# Patient Record
Sex: FEMALE | Race: OTHER | NOT HISPANIC OR LATINO | Employment: OTHER | URBAN - METROPOLITAN AREA
[De-identification: names, ages, dates, MRNs, and addresses within clinical notes are randomized per-mention and may not be internally consistent; named-entity substitution may affect disease eponyms.]

---

## 2017-01-16 ENCOUNTER — OFFICE VISIT (OUTPATIENT)
Dept: FAMILY MEDICINE CLINIC | Facility: CLINIC | Age: 57
End: 2017-01-16

## 2017-01-16 VITALS
DIASTOLIC BLOOD PRESSURE: 82 MMHG | TEMPERATURE: 97.8 F | WEIGHT: 229 LBS | HEART RATE: 74 BPM | HEIGHT: 67 IN | BODY MASS INDEX: 35.94 KG/M2 | SYSTOLIC BLOOD PRESSURE: 118 MMHG | RESPIRATION RATE: 18 BRPM

## 2017-01-16 DIAGNOSIS — R00.2 HEART PALPITATIONS: Primary | ICD-10-CM

## 2017-01-16 DIAGNOSIS — F41.1 GAD (GENERALIZED ANXIETY DISORDER): ICD-10-CM

## 2017-01-16 LAB
ALBUMIN SERPL-MCNC: 4.5 G/DL (ref 3.2–4.8)
ALBUMIN/GLOB SERPL: 1.7 G/DL (ref 1.5–2.5)
ALP SERPL-CCNC: 88 U/L (ref 25–100)
ALT SERPL-CCNC: 29 U/L (ref 7–40)
AST SERPL-CCNC: 31 U/L (ref 0–33)
BASOPHILS # BLD AUTO: 0.02 10*3/MM3 (ref 0–0.2)
BASOPHILS NFR BLD AUTO: 0.2 % (ref 0–1)
BILIRUB SERPL-MCNC: 0.4 MG/DL (ref 0.3–1.2)
BUN SERPL-MCNC: 14 MG/DL (ref 9–23)
BUN/CREAT SERPL: 20 (ref 7–25)
CALCIUM SERPL-MCNC: 10.4 MG/DL (ref 8.7–10.4)
CHLORIDE SERPL-SCNC: 107 MMOL/L (ref 99–109)
CO2 SERPL-SCNC: 28 MMOL/L (ref 20–31)
CREAT SERPL-MCNC: 0.7 MG/DL (ref 0.6–1.3)
EOSINOPHIL # BLD AUTO: 0.14 10*3/MM3 (ref 0.1–0.3)
EOSINOPHIL NFR BLD AUTO: 1.6 % (ref 0–3)
ERYTHROCYTE [DISTWIDTH] IN BLOOD BY AUTOMATED COUNT: 15.1 % (ref 11.3–14.5)
GLOBULIN SER CALC-MCNC: 2.7 GM/DL
GLUCOSE SERPL-MCNC: 90 MG/DL (ref 70–100)
HCT VFR BLD AUTO: 46.6 % (ref 34.5–44)
HGB BLD-MCNC: 14.8 G/DL (ref 11.5–15.5)
IMM GRANULOCYTES # BLD: 0.03 10*3/MM3 (ref 0–0.03)
IMM GRANULOCYTES NFR BLD: 0.3 % (ref 0–0.6)
LYMPHOCYTES # BLD AUTO: 2.33 10*3/MM3 (ref 0.6–4.8)
LYMPHOCYTES NFR BLD AUTO: 26.2 % (ref 24–44)
MAGNESIUM SERPL-MCNC: 2.2 MG/DL (ref 1.3–2.7)
MCH RBC QN AUTO: 29.7 PG (ref 27–31)
MCHC RBC AUTO-ENTMCNC: 31.8 G/DL (ref 32–36)
MCV RBC AUTO: 93.6 FL (ref 80–99)
MONOCYTES # BLD AUTO: 0.8 10*3/MM3 (ref 0–1)
MONOCYTES NFR BLD AUTO: 9 % (ref 0–12)
NEUTROPHILS # BLD AUTO: 5.58 10*3/MM3 (ref 1.5–8.3)
NEUTROPHILS NFR BLD AUTO: 62.7 % (ref 41–71)
PLATELET # BLD AUTO: 248 10*3/MM3 (ref 150–450)
POTASSIUM SERPL-SCNC: 5.1 MMOL/L (ref 3.5–5.5)
PROT SERPL-MCNC: 7.2 G/DL (ref 5.7–8.2)
RBC # BLD AUTO: 4.98 10*6/MM3 (ref 3.89–5.14)
SODIUM SERPL-SCNC: 140 MMOL/L (ref 132–146)
TSH SERPL DL<=0.005 MIU/L-ACNC: 1.82 MIU/ML (ref 0.35–5.35)
WBC # BLD AUTO: 8.9 10*3/MM3 (ref 3.5–10.8)

## 2017-01-16 PROCEDURE — 99214 OFFICE O/P EST MOD 30 MIN: CPT | Performed by: FAMILY MEDICINE

## 2017-01-16 PROCEDURE — 93000 ELECTROCARDIOGRAM COMPLETE: CPT | Performed by: FAMILY MEDICINE

## 2017-01-16 NOTE — MR AVS SNAPSHOT
Dede Solitario   2017 10:00 AM   Office Visit    Dept Phone:  [phone]   Encounter #:  [id number]    Provider:  Quinn Gonzales MD   Department:  [facility]                Your Full Care Plan              Your Updated Medication List          This list is accurate as of: 17 10:21 AM.  Always use your most recent med list.                aspirin 81 MG tablet       raNITIdine 150 MG tablet   Commonly known as:  ZANTAC   Take 1 tablet by mouth 2 (two) times a day.               We Performed the Following     CBC & Differential     Comprehensive Metabolic Panel     Magnesium     TSH       You Were Diagnosed With        Codes Comments    Heart palpitations    -  Primary ICD-10-CM: R00.2  ICD-9-CM: 785.1     MICHAEL (generalized anxiety disorder)     ICD-10-CM: F41.1  ICD-9-CM: 300.02       Instructions     None    Patient Instructions History      Upcoming Appointments     Visit Type Date Time Department    OFFICE VISIT 2017 10:00 AM [facility]      [employer] Signup     [facility] [employer] allows you to send messages to your doctor, view your test results, renew your prescriptions, schedule appointments, and more. To sign up, go to j-[email or web address] and click on the Sign Up Now link in the New User? box. Enter your [employer] Activation Code exactly as it appears below along with the last four digits of your Social Security Number and your Date of Birth () to complete the sign-up process. If you do not sign up before the expiration date, you must request a new code.    [employer] Activation Code: [email or web address]  Expires: 2017 10:21 AM    If you have questions, you can email [email or web address] or call [phone] to talk to our [employer] staff. Remember, [employer] is NOT to be used for urgent needs. For medical emergencies, dial 911.               Other Info from Your Visit           Allergies     Biaxin [Clarithromycin]   "    TABS      Reason for Visit     Palpitations           Vital Signs     Blood Pressure Pulse Temperature Respirations Height Weight    118/82 74 97.8 °F (36.6 °C) 18 67\" (170.2 cm) 229 lb (104 kg)    Body Mass Index Smoking Status                35.87 kg/m2 Current Every Day Smoker          Problems and Diagnoses Noted     MICHAEL (generalized anxiety disorder)    Heart palpitations        "

## 2017-01-16 NOTE — PROGRESS NOTES
Subjective   Dede Jerez is a 56 y.o. female.     History of Present Illness   She is going to weight loss clinic, [facility] and getting some type of injections from them  She was having palpitations the last 2 weeks  Just strong beats,  They feel fast and sometimes feel irregular  Last 10-15 seconds  Worse when she is stressed  Better when she calms down  No CP and no SOA with these and not related to exercise  Happened 2-5 times a day  She can walk 2-3 miles on a treadmill without these happening    She notes she has been stressed as well  Her 15 year old grandson has an anuerysm and her  has cancer  Counseling continues tohelp her, she tried medicine in the past    The following portions of the patient's history were reviewed and updated as appropriate: allergies, current medications, past family history, past medical history, past social history, past surgical history and problem list.    Review of Systems   Constitutional: Negative.    HENT: Negative.    Eyes: Negative.    Respiratory: Negative.  Negative for shortness of breath.    Cardiovascular: Positive for palpitations. Negative for chest pain.   Gastrointestinal: Negative.    Musculoskeletal: Negative.    Skin: Negative.    Neurological: Negative.    Psychiatric/Behavioral: The patient is nervous/anxious.    All other systems reviewed and are negative.      Objective   Physical Exam   Constitutional: She is oriented to person, place, and time. She appears well-developed and well-nourished. No distress.   HENT:   Head: Normocephalic and atraumatic.   Right Ear: External ear normal.   Left Ear: External ear normal.   Nose: Nose normal.   Mouth/Throat: Oropharynx is clear and moist.   Eyes: Conjunctivae and EOM are normal.   Neck: Normal range of motion. Neck supple. No thyromegaly present.   Cardiovascular: Normal rate, regular rhythm and normal heart sounds.    No murmur heard.  Pulmonary/Chest: Effort normal and breath sounds normal. No respiratory  distress.   Abdominal: Soft. Bowel sounds are normal. She exhibits no distension and no mass. There is no tenderness.   Lymphadenopathy:     She has no cervical adenopathy.   Neurological: She is alert and oriented to person, place, and time.   Skin: Skin is warm and dry.   Psychiatric: She has a normal mood and affect. Her behavior is normal. Judgment and thought content normal.   Nursing note and vitals reviewed.    EKG: normal EKG, normal sinus rhythm, there are no previous tracings available for comparison.      Assessment/Plan   Dede was seen today for palpitations.    Diagnoses and all orders for this visit:    Heart palpitations  -     Magnesium  -     TSH  -     Comprehensive Metabolic Panel  -     CBC & Differential  -     Cardiac Event Monitor; Future    MICHAEL (generalized anxiety disorder)    EKG normal, will check labs and event monitor, f/u pending results, pt agrees.  She will also call if this is not better  Anxiety stable, continue with counseling

## 2017-01-20 ENCOUNTER — TELEPHONE (OUTPATIENT)
Dept: FAMILY MEDICINE CLINIC | Facility: CLINIC | Age: 57
End: 2017-01-20

## 2017-01-20 RX ORDER — HYDROXYZINE 50 MG/1
TABLET, FILM COATED ORAL
Qty: 30 TABLET | Refills: 1 | Status: SHIPPED | OUTPATIENT
Start: 2017-01-20 | End: 2017-03-15

## 2017-01-20 NOTE — TELEPHONE ENCOUNTER
----- Message from Pamela Shi sent at 1/20/2017 10:10 AM EST -----  Contact: CHRISTIANO PRIEST  THE PATIENTS   FABIANA ESQUEDA RECOMMENDED THAT THE PATIENT BE PLACED ON VISTARIL FOR HER NERVES DIDN'T RECOMMEND A DOSAGE AMOUNT. HER PHARMACY [place] IF THERE ARE QUESTIONS   [phone]

## 2017-01-30 ENCOUNTER — TELEPHONE (OUTPATIENT)
Dept: FAMILY MEDICINE CLINIC | Facility: CLINIC | Age: 57
End: 2017-01-30

## 2017-01-30 DIAGNOSIS — I47.1 SVT (SUPRAVENTRICULAR TACHYCARDIA) (HCC): Primary | ICD-10-CM

## 2017-01-30 NOTE — TELEPHONE ENCOUNTER
----- Message from Ethel Fiore sent at 1/30/2017 10:59 AM EST -----  Contact: Smith  Patient's heart monitor detected a heart abnormality on Saturday. She was told to either go to the ER or check with her doctor on Monday. Patient would like a call back [phone].

## 2017-01-30 NOTE — TELEPHONE ENCOUNTER
Pt had a run of SVT, short lived and it resolved on her own.  On call doctor said she was stable but I want her to see a cardiologist to review what the treatment is going to be for this irregular rhythm.  Referral order in

## 2017-02-06 ENCOUNTER — OFFICE VISIT (OUTPATIENT)
Dept: CARDIOLOGY | Facility: HOSPITAL | Age: 57
End: 2017-02-06

## 2017-02-06 VITALS
WEIGHT: 227 LBS | HEART RATE: 92 BPM | BODY MASS INDEX: 35.63 KG/M2 | TEMPERATURE: 98.3 F | DIASTOLIC BLOOD PRESSURE: 73 MMHG | RESPIRATION RATE: 20 BRPM | SYSTOLIC BLOOD PRESSURE: 123 MMHG | HEIGHT: 67 IN | OXYGEN SATURATION: 98 %

## 2017-02-06 DIAGNOSIS — I47.1 SVT (SUPRAVENTRICULAR TACHYCARDIA) (HCC): Primary | ICD-10-CM

## 2017-02-06 DIAGNOSIS — R00.2 HEART PALPITATIONS: ICD-10-CM

## 2017-02-06 DIAGNOSIS — F41.1 GAD (GENERALIZED ANXIETY DISORDER): ICD-10-CM

## 2017-02-06 PROCEDURE — 99214 OFFICE O/P EST MOD 30 MIN: CPT | Performed by: NURSE PRACTITIONER

## 2017-02-06 NOTE — PATIENT INSTRUCTIONS
You will be called with a date and time for your echo in [place]  Stop ASA  Start Metoprolol 1/2 tablet twice a day  You will be called with a date and time for your appt with Cardiology

## 2017-02-06 NOTE — PROGRESS NOTES
Encounter Date:02/06/2017      Patient ID: Dede Jerez is a 56 y.o. female.        Subjective:     Chief Complaint: Establish Care (SVT)     History of Present Illness Patient presents to the [facility] today at the request of her PCP for ongoing evaluation of her SVT. Patient has been wearing a Biotelemetry monitor that has showed some episodes of SVT with heart rates up to 160. Patient notes that she is symptomatic with the SVT and notes palpitations, tachycardia, chest pain/pressure during episodes. She does deny  dyspnea, presyncope, syncope, orthopnea, PND, abdominal fullness, early satiety, claudication, cough or edema.    Patient Active Problem List   Diagnosis   • Arthritis   • MICHAEL (generalized anxiety disorder)   • Tobacco abuse   • Heart palpitations   • SVT (supraventricular tachycardia)   • Gastroesophageal reflux disease without esophagitis       Past Surgical History   Procedure Laterality Date   • Carpal tunnel release Bilateral    • Tubal abdominal ligation  1980       Allergies   Allergen Reactions   • Biaxin [Clarithromycin] Confusion     TABS         Current Outpatient Prescriptions:   •  aspirin 81 MG tablet, Take 81 mg by mouth daily., Disp: , Rfl:   •  hydrOXYzine (ATARAX) 50 MG tablet, 1/2-1 po q 8 hours PRN, Disp: 30 tablet, Rfl: 1  •  ranitidine (ZANTAC) 150 MG tablet, Take 1 tablet by mouth 2 (two) times a day., Disp: 60 tablet, Rfl: 5      The following portions of the chart were reviewed and updated as appropriate: Allergies, current medications, past family history, social history, past medical history.     Review of Systems   Constitution: Positive for weight loss. Negative for chills, decreased appetite, diaphoresis, fever, weakness, malaise/fatigue, night sweats and weight gain.   HENT: Negative for congestion, headaches and nosebleeds.    Eyes: Negative for blurred vision, visual disturbance and visual halos.   Cardiovascular: Positive for chest pain, irregular  "heartbeat and palpitations. Negative for claudication, cyanosis, dyspnea on exertion, leg swelling, near-syncope, orthopnea, paroxysmal nocturnal dyspnea and syncope.   Respiratory: Positive for snoring. Negative for cough, hemoptysis, shortness of breath, sleep disturbances due to breathing, sputum production and wheezing.    Endocrine: Negative for cold intolerance, heat intolerance, polydipsia, polyphagia and polyuria.   Hematologic/Lymphatic: Does not bruise/bleed easily.   Skin: Negative for dry skin, itching and rash.   Musculoskeletal: Positive for arthritis. Negative for falls, joint pain, joint swelling, muscle weakness and myalgias.   Gastrointestinal: Negative for bloating, abdominal pain, constipation, diarrhea, dysphagia, heartburn, melena, nausea and vomiting.   Genitourinary: Positive for nocturia. Negative for dysuria, flank pain and hematuria.   Neurological: Positive for loss of balance. Negative for difficulty with concentration, excessive daytime sleepiness and dizziness.   Psychiatric/Behavioral: Negative for altered mental status and depression. The patient is nervous/anxious.    Allergic/Immunologic: Positive for environmental allergies.           Objective:     Vitals:    02/06/17 1124 02/06/17 1125 02/06/17 1126   BP: 122/75 126/79 123/73   BP Location: Right arm Left arm Left arm   Patient Position: Sitting Sitting Standing   Pulse: 80  92   Resp: 20     Temp: 98.3 °F (36.8 °C)     TempSrc: Temporal Artery      SpO2: 98%     Weight: 227 lb (103 kg)     Height: 67\" (170.2 cm)           Physical Exam   Constitutional: She is oriented to person, place, and time. She appears well-developed and well-nourished. She is active and cooperative. No distress.   HENT:   Head: Normocephalic and atraumatic.   Mouth/Throat: Oropharynx is clear and moist.   Eyes: Conjunctivae and EOM are normal. Pupils are equal, round, and reactive to light.   Neck: Normal range of motion. Neck supple. No JVD present. No " tracheal deviation present. No thyromegaly present.   Cardiovascular: Normal rate, regular rhythm, normal heart sounds and intact distal pulses.    Pulmonary/Chest: Effort normal and breath sounds normal.   Abdominal: Soft. Bowel sounds are normal. She exhibits no distension. There is no tenderness.   Musculoskeletal: Normal range of motion.   Neurological: She is alert and oriented to person, place, and time.   Skin: Skin is warm, dry and intact.   Psychiatric: She has a normal mood and affect. Her behavior is normal.   Nursing note and vitals reviewed.      Lab and Diagnostic Review:    1/16/ 2017: Glucose 90, BUN 14, creatinine 0.7, sodium 140, potassium 5.1, chloride 107, CO2 28, calcium 10.4, total protein 7.2, albumen 4.5, total bilirubin 0.4, alkaline phosphatase 88, AST 31, ALT 29, TSH 1.818, magnesium 2.2, WBC 8.9, RBC 4.98, hemoglobin 14.8, hematocrit 46.6, platelets 248      Assessment and Plan:         1. SVT (supraventricular tachycardia)  Patient to begin metoprolol 25 mg 1/2 tablet bid   - Adult Transthoracic Echo Complete; Future  Amb referral to Cardiology  2. Heart palpitations  Related to SVT  Patient to begin metoprolol tartrate 25 mg 1/2 tablet bid    3. MICHAEL (generalized anxiety disorder)  Prn atarax    It has been a pleasure to participate in the care of this patient.  Patient was instructed to call the [facility] with any questions, concerns, or worsening symptoms.      * Please note that portions of this note were completed with a voice recognition program. Efforts were made to edit the dictation but occasionally words are transcribed.

## 2017-02-08 ENCOUNTER — HOSPITAL ENCOUNTER (OUTPATIENT)
Dept: CARDIOLOGY | Facility: HOSPITAL | Age: 57
Discharge: HOME | End: 2017-02-08
Admitting: NURSE PRACTITIONER

## 2017-02-08 VITALS
SYSTOLIC BLOOD PRESSURE: 98 MMHG | DIASTOLIC BLOOD PRESSURE: 51 MMHG | WEIGHT: 227 LBS | BODY MASS INDEX: 35.63 KG/M2 | HEIGHT: 67 IN

## 2017-02-08 DIAGNOSIS — I47.1 SVT (SUPRAVENTRICULAR TACHYCARDIA) (HCC): ICD-10-CM

## 2017-02-08 LAB
BH CV ECHO MEAS - AO ROOT AREA (BSA CORRECTED): 1.3
BH CV ECHO MEAS - AO ROOT AREA: 6.2 CM^2
BH CV ECHO MEAS - AO ROOT DIAM: 2.8 CM
BH CV ECHO MEAS - BSA(HAYCOCK): 2.2 M^2
BH CV ECHO MEAS - BSA: 2.1 M^2
BH CV ECHO MEAS - BZI_BMI: 35.6 KILOGRAMS/M^2
BH CV ECHO MEAS - BZI_METRIC_HEIGHT: 170.2 CM
BH CV ECHO MEAS - BZI_METRIC_WEIGHT: 103 KG
BH CV ECHO MEAS - CONTRAST EF (2CH): 73.3 ML/M^2
BH CV ECHO MEAS - CONTRAST EF 4CH: 73.2 ML/M^2
BH CV ECHO MEAS - EDV(CUBED): 96.5 ML
BH CV ECHO MEAS - EDV(MOD-SP2): 60 ML
BH CV ECHO MEAS - EDV(MOD-SP4): 41 ML
BH CV ECHO MEAS - EDV(TEICH): 96.7 ML
BH CV ECHO MEAS - EF(CUBED): 73.9 %
BH CV ECHO MEAS - EF(MOD-SP2): 73.3 %
BH CV ECHO MEAS - EF(MOD-SP4): 73 %
BH CV ECHO MEAS - EF(TEICH): 65.8 %
BH CV ECHO MEAS - ESV(CUBED): 25.2 ML
BH CV ECHO MEAS - ESV(MOD-SP2): 16 ML
BH CV ECHO MEAS - ESV(MOD-SP4): 11 ML
BH CV ECHO MEAS - ESV(TEICH): 33.1 ML
BH CV ECHO MEAS - FS: 36.1 %
BH CV ECHO MEAS - IVS/LVPW: 1.1
BH CV ECHO MEAS - IVSD: 1.1 CM
BH CV ECHO MEAS - LA DIMENSION: 3.6 CM
BH CV ECHO MEAS - LA/AO: 1.3
BH CV ECHO MEAS - LV DIASTOLIC VOL/BSA (35-75): 19.2 ML/M^2
BH CV ECHO MEAS - LV MASS(C)D: 167.1 GRAMS
BH CV ECHO MEAS - LV MASS(C)DI: 78.3 GRAMS/M^2
BH CV ECHO MEAS - LV SYSTOLIC VOL/BSA (12-30): 5.2 ML/M^2
BH CV ECHO MEAS - LVIDD: 4.6 CM
BH CV ECHO MEAS - LVIDS: 2.9 CM
BH CV ECHO MEAS - LVLD AP2: 6.8 CM
BH CV ECHO MEAS - LVLD AP4: 6.4 CM
BH CV ECHO MEAS - LVLS AP2: 5.8 CM
BH CV ECHO MEAS - LVLS AP4: 5.3 CM
BH CV ECHO MEAS - LVPWD: 1 CM
BH CV ECHO MEAS - MV A MAX VEL: 83.4 CM/SEC
BH CV ECHO MEAS - MV DEC SLOPE: 355.8 CM/SEC^2
BH CV ECHO MEAS - MV DEC TIME: 0.2 SEC
BH CV ECHO MEAS - MV E MAX VEL: 83.4 CM/SEC
BH CV ECHO MEAS - MV E/A: 1
BH CV ECHO MEAS - PA ACC SLOPE: 694.4 CM/SEC^2
BH CV ECHO MEAS - PA ACC TIME: 0.13 SEC
BH CV ECHO MEAS - PA PR(ACCEL): 18.8 MMHG
BH CV ECHO MEAS - RAP SYSTOLE: 3 MMHG
BH CV ECHO MEAS - RVDD: 2.6 CM
BH CV ECHO MEAS - RVSP: 20.7 MMHG
BH CV ECHO MEAS - SI(CUBED): 33.4 ML/M^2
BH CV ECHO MEAS - SI(MOD-SP2): 20.6 ML/M^2
BH CV ECHO MEAS - SI(MOD-SP4): 14.1 ML/M^2
BH CV ECHO MEAS - SI(TEICH): 29.8 ML/M^2
BH CV ECHO MEAS - SV(CUBED): 71.3 ML
BH CV ECHO MEAS - SV(MOD-SP2): 44 ML
BH CV ECHO MEAS - SV(MOD-SP4): 30 ML
BH CV ECHO MEAS - SV(TEICH): 63.6 ML
BH CV ECHO MEAS - TAPSE (>1.6): 2 CM2
BH CV ECHO MEAS - TR MAX VEL: 209.5 CM/SEC
BH CV XLRA - RV BASE: 2.9 CM
BH CV XLRA - RV LENGTH: 6.2 CM
BH CV XLRA - RV MID: 3 CM
BH CV XLRA - TDI S': 9.5 CM/SEC
LEFT ATRIUM VOLUME INDEX: 23.5 ML/M2
LV EF 2D ECHO EST: 65 %

## 2017-02-08 PROCEDURE — 93306 TTE W/DOPPLER COMPLETE: CPT

## 2017-02-08 PROCEDURE — 93306 TTE W/DOPPLER COMPLETE: CPT | Performed by: INTERNAL MEDICINE

## 2017-02-09 PROBLEM — K21.9 GASTROESOPHAGEAL REFLUX DISEASE WITHOUT ESOPHAGITIS: Status: ACTIVE | Noted: 2017-02-09

## 2017-02-09 PROBLEM — I47.1 SVT (SUPRAVENTRICULAR TACHYCARDIA) (HCC): Status: ACTIVE | Noted: 2017-02-09

## 2017-02-09 PROBLEM — I47.10 SVT (SUPRAVENTRICULAR TACHYCARDIA): Status: ACTIVE | Noted: 2017-02-09

## 2017-02-20 DIAGNOSIS — I47.1 SVT (SUPRAVENTRICULAR TACHYCARDIA) (HCC): Primary | ICD-10-CM

## 2017-02-21 ENCOUNTER — TELEPHONE (OUTPATIENT)
Dept: CARDIOLOGY | Facility: HOSPITAL | Age: 57
End: 2017-02-21

## 2017-02-21 NOTE — TELEPHONE ENCOUNTER
----- Message from Sapna Wang sent at 2/20/2017 12:06 PM EST -----  Patient reports to still have some episodes of palpitations and tachy episodes but they are less severe and less frequent.

## 2017-03-15 ENCOUNTER — CONSULT (OUTPATIENT)
Dept: CARDIOLOGY | Facility: CLINIC | Age: 57
End: 2017-03-15

## 2017-03-15 VITALS
HEIGHT: 67 IN | DIASTOLIC BLOOD PRESSURE: 82 MMHG | SYSTOLIC BLOOD PRESSURE: 126 MMHG | BODY MASS INDEX: 33.65 KG/M2 | HEART RATE: 77 BPM | WEIGHT: 214.4 LBS

## 2017-03-15 DIAGNOSIS — Z72.0 TOBACCO ABUSE: ICD-10-CM

## 2017-03-15 DIAGNOSIS — I47.1 SVT (SUPRAVENTRICULAR TACHYCARDIA) (HCC): Primary | ICD-10-CM

## 2017-03-15 PROCEDURE — 93000 ELECTROCARDIOGRAM COMPLETE: CPT | Performed by: INTERNAL MEDICINE

## 2017-03-15 PROCEDURE — 99244 OFF/OP CNSLTJ NEW/EST MOD 40: CPT | Performed by: INTERNAL MEDICINE

## 2017-03-15 NOTE — PROGRESS NOTES
Cardiology Consult     Dede Jerez  1960  [phone]      03/15/17      [facility] CARDIOLOGY    Quinn Gonzales MD  [address] [place] [address] / [facility] [address]    Chief Complaint   Patient presents with   • Palpitations     Problem List:  1. SVT   A. Echocardiogram 2/8/17: EF 65%, mild TR   B. Event Monitor 1/27/17-2/20/17: runs of SVT at 150 bpm  2. Anxiety  3. Tobacco Abuse  4. GERD  5. Arthritis      History of Present Illness:   56 year old WF referred by the [facility] for SVT. She states that in December, she started having really rapid heart beats and rates with occasional skipped beats. It would last all day. And after it stopped, she felt pain/discomfort across her chest and back. It occurred on and off throughout the day and occurred at random.  She was under a lot of stress at the time. She wore a monitor which showed SVT. She did have her symptoms during the monitor . Since then, she has been put on Metoprolol which improved her symptoms. Also, the health problems in her family resolved as well. Now she is doing well with no symptoms and no CP or SOB. She has not gone to the ER for this or received adenosine. She had normal echocardiogram. She has never had a LHC. She had a normal stress test about 7 years ago. In fact, prior to her episodes of palpitations, she was actually walking on the treadmill and doing well. In the past, she had what she thought was panic attacks with some racing hearts.  She drinks 3 cups of coffee daily. No ETOH. She does smoke 1 PPD. She did take Sudafed before all this happened.     Allergies   Allergen Reactions   • Biaxin [Clarithromycin] Confusion     TABS        Cannot display prior to admission medications because the patient has not been admitted in this contact.            Current Outpatient Prescriptions:   •  metoprolol tartrate (LOPRESSOR) 25 MG tablet, Take 1/2 tablet twice a day (Patient taking differently: 25  mg Daily.), Disp: 60 tablet, Rfl: 11  •  ranitidine (ZANTAC) 150 MG tablet, Take 1 tablet by mouth 2 (two) times a day. (Patient taking differently: Take 150 mg by mouth 2 (Two) Times a Day As Needed.), Disp: 60 tablet, Rfl: 5    Social History     Social History   • Marital status:      Spouse name: N/A   • Number of children: 2   • Years of education: N/A     Social History Main Topics   • Smoking status: Current Every Day Smoker     Packs/day: 0.75     Types: Cigarettes   • Smokeless tobacco: Never Used      Comment: on and off since teenager   • Alcohol use No   • Drug use: Defer   • Sexual activity: Defer     Other Topics Concern   • None     Social History Narrative    Patient consumes 2-3 cups coffee daily.     Patient lives at home with .            Family History   Problem Relation Age of Onset   • Heart attack Mother    • Coronary artery disease Mother    • Diabetes Mother    • Hypertension Mother    • Hypertension Father    • Hypertension Sister    • Diabetes Sister    • Hypertension Brother    • Diabetes Brother    • Hypertension Maternal Grandmother    • Hypertension Maternal Grandfather    • No Known Problems Paternal Grandmother    • No Known Problems Paternal Grandfather        REVIEW OF SYSTEMS:   CONST:  No weight loss, fever, chills, weakness or fatigue.   HEENT:  No visual loss, blurred vision, double vision, yellow sclerae.                   No hearing loss, congestion, sore throat.   SKIN:      No rashes, urticaria, ulcers, sores.     RESP:     No shortness of breath, hemoptysis, cough, sputum.   GI:           No anorexia, nausea, vomiting, diarrhea. No abdominal pain, melena.   :         No burning on urination, hematuria or increased frequency.  ENDO:    No diaphoresis, cold or heat intolerance. No polyuria or polydipsia.   NEURO:  No headache, dizziness, syncope, paralysis, ataxia, or parasthesias.                  No change in bowel or bladder control. No history of  "CVA/TIA  MUSC:    No muscle, back pain, joint pain or stiffness.   HEME:    No anemia, bleeding, bruising. No history of DVT/PE.  PSYCH:  No history of depression, Positive history of anxiety    Vitals:    03/15/17 1601   BP: 126/82   BP Location: Left arm   Patient Position: Sitting   Pulse: 77   Weight: 214 lb 6.4 oz (97.3 kg)   Height: 67\" (170.2 cm)       Physical Exam:  GEN: Well nourished, Well- developed  No acute distress  HEENT: Normocephalic, Atraumatic, PERRLA, moist mucous membranes  NECK: supple, NO JVD, no thyromegaly, no lymphadenopathy  CARD: S1S2  RRR no murmur, gallop, rub  LUNGS: Clear to auscultataion, normal respiratory effort  ABDOMEN: Soft, nontender, normal bowel sounds  EXTREMITIES:No gross deformities,  No clubbing, cyanosis, or edema  SKIN: Warm, dry  NEURO: No focal deficits  PSYCHIATRIC: Normal affect and mood      Data:                                        ECG 12 Lead  Date/Time: 3/15/2017 5:02 PM  Performed by: DERRICK GRIMES  Authorized by: DERRICK GRIMES   Rhythm: sinus rhythm  BPM: 77                Assessment and Plan:   1. SVT (supraventricular tachycardia)    2. Tobacco abuse      1. 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 Options include continuing metoprolol vs EPS +/- RFA SVT. The risks, benefits, and alternatives of the procedure have been reviewed and the patient wants to try the medication for now and monitor.     2. Tobacco abuse- counseled cessation     Scribed for Derrick Grimes MD by Monica Mcpherson PA-C. 3/15/2017  5:04 PM     IDerrick MD, personally performed the services face to face as described in this documentation and as scribed by the above named individual in my presence, and it is both accurate and complete.  3/15/2017  5:06 PM                    "

## 2017-07-18 ENCOUNTER — TELEPHONE (OUTPATIENT)
Dept: CARDIOLOGY | Facility: CLINIC | Age: 57
End: 2017-07-18

## 2017-07-18 DIAGNOSIS — I47.1 SVT (SUPRAVENTRICULAR TACHYCARDIA) (HCC): Primary | ICD-10-CM

## 2017-09-13 ENCOUNTER — PREP FOR SURGERY (OUTPATIENT)
Dept: OTHER | Facility: HOSPITAL | Age: 57
End: 2017-09-13

## 2017-09-13 DIAGNOSIS — I47.1 SVT (SUPRAVENTRICULAR TACHYCARDIA) (HCC): Primary | ICD-10-CM

## 2017-09-13 RX ORDER — ACETAMINOPHEN 325 MG/1
650 TABLET ORAL EVERY 4 HOURS PRN
Status: CANCELLED | OUTPATIENT
Start: 2017-09-13

## 2017-09-13 RX ORDER — NITROGLYCERIN 0.4 MG/1
0.4 TABLET SUBLINGUAL
Status: CANCELLED | OUTPATIENT
Start: 2017-09-13

## 2017-09-13 RX ORDER — PROMETHAZINE HYDROCHLORIDE 25 MG/ML
12.5 INJECTION, SOLUTION INTRAMUSCULAR; INTRAVENOUS EVERY 4 HOURS PRN
Status: CANCELLED | OUTPATIENT
Start: 2017-09-13

## 2017-09-14 RX ORDER — RANITIDINE 150 MG/1
TABLET ORAL
Qty: 60 TABLET | Refills: 5 | Status: SHIPPED | OUTPATIENT
Start: 2017-09-14 | End: 2018-03-26

## 2017-09-25 ENCOUNTER — OFFICE VISIT (OUTPATIENT)
Dept: FAMILY MEDICINE CLINIC | Facility: CLINIC | Age: 57
End: 2017-09-25

## 2017-09-25 VITALS
SYSTOLIC BLOOD PRESSURE: 112 MMHG | HEART RATE: 76 BPM | HEIGHT: 67 IN | BODY MASS INDEX: 28.56 KG/M2 | TEMPERATURE: 97.3 F | WEIGHT: 182 LBS | RESPIRATION RATE: 18 BRPM | DIASTOLIC BLOOD PRESSURE: 78 MMHG

## 2017-09-25 DIAGNOSIS — R00.2 HEART PALPITATIONS: Primary | ICD-10-CM

## 2017-09-25 DIAGNOSIS — K21.9 GASTROESOPHAGEAL REFLUX DISEASE WITHOUT ESOPHAGITIS: ICD-10-CM

## 2017-09-25 LAB
ALBUMIN SERPL-MCNC: 4.6 G/DL (ref 3.2–4.8)
ALBUMIN/GLOB SERPL: 1.8 G/DL (ref 1.5–2.5)
ALP SERPL-CCNC: 79 U/L (ref 25–100)
ALT SERPL-CCNC: 19 U/L (ref 7–40)
AST SERPL-CCNC: 18 U/L (ref 0–33)
BASOPHILS # BLD AUTO: 0.04 10*3/MM3 (ref 0–0.2)
BASOPHILS NFR BLD AUTO: 0.5 % (ref 0–1)
BILIRUB SERPL-MCNC: 0.4 MG/DL (ref 0.3–1.2)
BUN SERPL-MCNC: 15 MG/DL (ref 9–23)
BUN/CREAT SERPL: 18.8 (ref 7–25)
CALCIUM SERPL-MCNC: 9.6 MG/DL (ref 8.7–10.4)
CHLORIDE SERPL-SCNC: 109 MMOL/L (ref 99–109)
CO2 SERPL-SCNC: 29 MMOL/L (ref 20–31)
CREAT SERPL-MCNC: 0.8 MG/DL (ref 0.6–1.3)
EOSINOPHIL # BLD AUTO: 0.13 10*3/MM3 (ref 0–0.3)
EOSINOPHIL NFR BLD AUTO: 1.8 % (ref 0–3)
ERYTHROCYTE [DISTWIDTH] IN BLOOD BY AUTOMATED COUNT: 13.5 % (ref 11.3–14.5)
GLOBULIN SER CALC-MCNC: 2.5 GM/DL
GLUCOSE SERPL-MCNC: 86 MG/DL (ref 70–100)
HCT VFR BLD AUTO: 44.6 % (ref 34.5–44)
HGB BLD-MCNC: 14.4 G/DL (ref 11.5–15.5)
IMM GRANULOCYTES # BLD: 0.02 10*3/MM3 (ref 0–0.03)
IMM GRANULOCYTES NFR BLD: 0.3 % (ref 0–0.6)
LYMPHOCYTES # BLD AUTO: 2.32 10*3/MM3 (ref 0.6–4.8)
LYMPHOCYTES NFR BLD AUTO: 31.4 % (ref 24–44)
MAGNESIUM SERPL-MCNC: 2 MG/DL (ref 1.3–2.7)
MCH RBC QN AUTO: 30.6 PG (ref 27–31)
MCHC RBC AUTO-ENTMCNC: 32.3 G/DL (ref 32–36)
MCV RBC AUTO: 94.7 FL (ref 80–99)
MONOCYTES # BLD AUTO: 0.65 10*3/MM3 (ref 0–1)
MONOCYTES NFR BLD AUTO: 8.8 % (ref 0–12)
NEUTROPHILS # BLD AUTO: 4.24 10*3/MM3 (ref 1.5–8.3)
NEUTROPHILS NFR BLD AUTO: 57.2 % (ref 41–71)
PLATELET # BLD AUTO: 257 10*3/MM3 (ref 150–450)
POTASSIUM SERPL-SCNC: 5.5 MMOL/L (ref 3.5–5.5)
PROT SERPL-MCNC: 7.1 G/DL (ref 5.7–8.2)
RBC # BLD AUTO: 4.71 10*6/MM3 (ref 3.89–5.14)
SODIUM SERPL-SCNC: 144 MMOL/L (ref 132–146)
TSH SERPL DL<=0.005 MIU/L-ACNC: 1.16 MIU/ML (ref 0.35–5.35)
WBC # BLD AUTO: 7.4 10*3/MM3 (ref 3.5–10.8)

## 2017-09-25 PROCEDURE — 99213 OFFICE O/P EST LOW 20 MIN: CPT | Performed by: FAMILY MEDICINE

## 2017-09-25 NOTE — PROGRESS NOTES
Subjective   Dede Jerez is a 57 y.o. female.     History of Present Illness     Overall pt has been doing great.  No recent heart palpitations  No complaints and tolerating the medicine well    She still uses the zantac but most of the time once a day    The following portions of the patient's history were reviewed and updated as appropriate: allergies, current medications, past family history, past medical history, past social history, past surgical history and problem list.    Review of Systems   Constitutional: Negative.    Respiratory: Negative.    Cardiovascular: Negative.    Gastrointestinal: Negative.    Psychiatric/Behavioral: Negative.        Objective   Physical Exam   Constitutional: She appears well-developed and well-nourished. No distress.   Cardiovascular: Normal rate, regular rhythm and normal heart sounds.    Pulmonary/Chest: Effort normal and breath sounds normal.   Psychiatric: She has a normal mood and affect. Her behavior is normal.   Nursing note and vitals reviewed.      Assessment/Plan   Dede was seen today for follow-up.    Diagnoses and all orders for this visit:    Heart palpitations  -     metoprolol tartrate (LOPRESSOR) 25 MG tablet; Take 1/2 tablet twice a day  -     CBC & Differential  -     Comprehensive Metabolic Panel  -     Magnesium  -     TSH    Gastroesophageal reflux disease without esophagitis    will continue lopressor and check labs for other causes.  Pt to call INB  Ok PRn zantac.

## 2017-10-30 ENCOUNTER — APPOINTMENT (OUTPATIENT)
Dept: PREADMISSION TESTING | Facility: HOSPITAL | Age: 57
End: 2017-10-30

## 2017-10-30 DIAGNOSIS — I47.1 SVT (SUPRAVENTRICULAR TACHYCARDIA) (HCC): ICD-10-CM

## 2017-10-30 LAB
ANION GAP SERPL CALCULATED.3IONS-SCNC: 3 MMOL/L (ref 3–11)
BUN BLD-MCNC: 16 MG/DL (ref 9–23)
BUN/CREAT SERPL: 22.9 (ref 7–25)
CALCIUM SPEC-SCNC: 9.2 MG/DL (ref 8.7–10.4)
CHLORIDE SERPL-SCNC: 108 MMOL/L (ref 99–109)
CO2 SERPL-SCNC: 30 MMOL/L (ref 20–31)
CREAT BLD-MCNC: 0.7 MG/DL (ref 0.6–1.3)
DEPRECATED RDW RBC AUTO: 45.8 FL (ref 37–54)
ERYTHROCYTE [DISTWIDTH] IN BLOOD BY AUTOMATED COUNT: 13.2 % (ref 11.3–14.5)
GFR SERPL CREATININE-BSD FRML MDRD: 104 ML/MIN/1.73
GFR SERPL CREATININE-BSD FRML MDRD: 86 ML/MIN/1.73
GLUCOSE BLD-MCNC: 84 MG/DL (ref 70–100)
HBA1C MFR BLD: 5.4 % (ref 4.8–5.6)
HCT VFR BLD AUTO: 44.1 % (ref 34.5–44)
HGB BLD-MCNC: 14.4 G/DL (ref 11.5–15.5)
INR PPP: 0.98
MCH RBC QN AUTO: 30.6 PG (ref 27–31)
MCHC RBC AUTO-ENTMCNC: 32.7 G/DL (ref 32–36)
MCV RBC AUTO: 93.8 FL (ref 80–99)
PLATELET # BLD AUTO: 235 10*3/MM3 (ref 150–450)
PMV BLD AUTO: 11.7 FL (ref 6–12)
POTASSIUM BLD-SCNC: 4.8 MMOL/L (ref 3.5–5.5)
PROTHROMBIN TIME: 10.7 SECONDS (ref 9.6–11.5)
RBC # BLD AUTO: 4.7 10*6/MM3 (ref 3.89–5.14)
SODIUM BLD-SCNC: 141 MMOL/L (ref 132–146)
WBC NRBC COR # BLD: 7.91 10*3/MM3 (ref 3.5–10.8)

## 2017-10-30 PROCEDURE — 80048 BASIC METABOLIC PNL TOTAL CA: CPT | Performed by: PHYSICIAN ASSISTANT

## 2017-10-30 PROCEDURE — 85610 PROTHROMBIN TIME: CPT | Performed by: PHYSICIAN ASSISTANT

## 2017-10-30 PROCEDURE — 85027 COMPLETE CBC AUTOMATED: CPT | Performed by: PHYSICIAN ASSISTANT

## 2017-10-30 PROCEDURE — 36415 COLL VENOUS BLD VENIPUNCTURE: CPT

## 2017-10-30 PROCEDURE — 83036 HEMOGLOBIN GLYCOSYLATED A1C: CPT | Performed by: PHYSICIAN ASSISTANT

## 2017-11-09 ENCOUNTER — HOSPITAL ENCOUNTER (OUTPATIENT)
Facility: HOSPITAL | Age: 57
Setting detail: OBSERVATION
Discharge: HOME | End: 2017-11-10
Attending: INTERNAL MEDICINE | Admitting: INTERNAL MEDICINE

## 2017-11-09 DIAGNOSIS — I47.1 SVT (SUPRAVENTRICULAR TACHYCARDIA) (HCC): ICD-10-CM

## 2017-11-09 PROCEDURE — 93623 PRGRMD STIMJ&PACG IV RX NFS: CPT | Performed by: INTERNAL MEDICINE

## 2017-11-09 PROCEDURE — 93613 INTRACARDIAC EPHYS 3D MAPG: CPT | Performed by: INTERNAL MEDICINE

## 2017-11-09 PROCEDURE — 93653 COMPRE EP EVAL TX SVT: CPT | Performed by: INTERNAL MEDICINE

## 2017-11-09 PROCEDURE — 25010000002 FENTANYL CITRATE (PF) 100 MCG/2ML SOLUTION: Performed by: INTERNAL MEDICINE

## 2017-11-09 PROCEDURE — G0378 HOSPITAL OBSERVATION PER HR: HCPCS

## 2017-11-09 PROCEDURE — 25010000002 ONDANSETRON PER 1 MG: Performed by: INTERNAL MEDICINE

## 2017-11-09 PROCEDURE — C1894 INTRO/SHEATH, NON-LASER: HCPCS | Performed by: INTERNAL MEDICINE

## 2017-11-09 PROCEDURE — C1730 CATH, EP, 19 OR FEW ELECT: HCPCS | Performed by: INTERNAL MEDICINE

## 2017-11-09 PROCEDURE — C1732 CATH, EP, DIAG/ABL, 3D/VECT: HCPCS | Performed by: INTERNAL MEDICINE

## 2017-11-09 PROCEDURE — 25010000002 MIDAZOLAM PER 1 MG: Performed by: INTERNAL MEDICINE

## 2017-11-09 RX ORDER — ONDANSETRON 2 MG/ML
INJECTION INTRAMUSCULAR; INTRAVENOUS AS NEEDED
Status: DISCONTINUED | OUTPATIENT
Start: 2017-11-09 | End: 2017-11-09 | Stop reason: HOSPADM

## 2017-11-09 RX ORDER — FENTANYL CITRATE 50 UG/ML
INJECTION, SOLUTION INTRAMUSCULAR; INTRAVENOUS AS NEEDED
Status: DISCONTINUED | OUTPATIENT
Start: 2017-11-09 | End: 2017-11-09 | Stop reason: HOSPADM

## 2017-11-09 RX ORDER — HYDROCODONE BITARTRATE AND ACETAMINOPHEN 5; 325 MG/1; MG/1
1 TABLET ORAL EVERY 4 HOURS PRN
Status: DISCONTINUED | OUTPATIENT
Start: 2017-11-09 | End: 2017-11-10 | Stop reason: HOSPADM

## 2017-11-09 RX ORDER — LIDOCAINE HYDROCHLORIDE 10 MG/ML
INJECTION, SOLUTION INFILTRATION; PERINEURAL AS NEEDED
Status: DISCONTINUED | OUTPATIENT
Start: 2017-11-09 | End: 2017-11-09 | Stop reason: HOSPADM

## 2017-11-09 RX ORDER — ONDANSETRON 2 MG/ML
4 INJECTION INTRAMUSCULAR; INTRAVENOUS EVERY 6 HOURS PRN
Status: DISCONTINUED | OUTPATIENT
Start: 2017-11-09 | End: 2017-11-10 | Stop reason: HOSPADM

## 2017-11-09 RX ORDER — NITROGLYCERIN 0.4 MG/1
0.4 TABLET SUBLINGUAL
Status: DISCONTINUED | OUTPATIENT
Start: 2017-11-09 | End: 2017-11-09 | Stop reason: HOSPADM

## 2017-11-09 RX ORDER — FAMOTIDINE 20 MG/1
20 TABLET, FILM COATED ORAL 2 TIMES DAILY
Status: DISCONTINUED | OUTPATIENT
Start: 2017-11-09 | End: 2017-11-10 | Stop reason: HOSPADM

## 2017-11-09 RX ORDER — PROMETHAZINE HYDROCHLORIDE 25 MG/ML
12.5 INJECTION, SOLUTION INTRAMUSCULAR; INTRAVENOUS EVERY 4 HOURS PRN
Status: DISCONTINUED | OUTPATIENT
Start: 2017-11-09 | End: 2017-11-09 | Stop reason: HOSPADM

## 2017-11-09 RX ORDER — MIDAZOLAM HYDROCHLORIDE 1 MG/ML
INJECTION INTRAMUSCULAR; INTRAVENOUS AS NEEDED
Status: DISCONTINUED | OUTPATIENT
Start: 2017-11-09 | End: 2017-11-09 | Stop reason: HOSPADM

## 2017-11-09 RX ORDER — ACETAMINOPHEN 325 MG/1
650 TABLET ORAL EVERY 4 HOURS PRN
Status: DISCONTINUED | OUTPATIENT
Start: 2017-11-09 | End: 2017-11-09 | Stop reason: HOSPADM

## 2017-11-09 RX ORDER — SODIUM CHLORIDE 9 MG/ML
INJECTION, SOLUTION INTRAVENOUS CONTINUOUS PRN
Status: DISCONTINUED | OUTPATIENT
Start: 2017-11-09 | End: 2017-11-09 | Stop reason: HOSPADM

## 2017-11-09 NOTE — PLAN OF CARE
Problem: Patient Care Overview (Adult)  Goal: Plan of Care Review  Outcome: Ongoing (interventions implemented as appropriate)    11/09/17 1200   Coping/Psychosocial Response Interventions   Plan Of Care Reviewed With patient   Patient Care Overview   Progress no change   Outcome Evaluation   Outcome Summary/Follow up Plan PT TO HAVE EP STUDY WITH DR. LEHMAN TODAY.

## 2017-11-09 NOTE — H&P
Electrophysiology History & Physical    Dede Jerez  2501/1  [id number]  1960    DATE OF ADMISSION: 11/9/2017    Quinn Gonzales MD      Chief complaint: Supraventricular tachycardia      Problem List:  1. SVT                        A. Echocardiogram 2/8/17: EF 65%, mild TR                        B. Event Monitor 1/27/17-2/20/17: runs of SVT at 150 bpm  2. Anxiety  3. Tobacco Abuse  4. GERD  5. Arthritis          Past Medical History:   Diagnosis Date   • Acid reflux    • Anxiety     panic attack related throat constricting making swallowing hard at times    • Arrhythmia    • Cataract     bilat - mild    • Constipation     in past with dieting but fine now    • High cholesterol    • Measles    • Menopause    • Stroke    • TIA (transient ischemic attack) 2010   • Wears glasses    • Wears partial dentures     bottom         Past Surgical History:   Procedure Laterality Date   • CARPAL TUNNEL RELEASE Bilateral    • COLONOSCOPY     • TUBAL ABDOMINAL LIGATION  1980         History of Present Illness:     56 year old WF who presents today for an EP study +/-RFA of her SVT - mostly likely AVNRT, with Dr. Grimes.  In December, she started having really rapid heart beats and rates with occasional skipped beats. It would last all day. And after it stopped, she felt pain/discomfort across her chest and back. It occurred on and off throughout the day and occurred at random.  She was under a lot of stress at the time. She wore a monitor which showed SVT. She did have her symptoms during the monitor . Since then, she has been put on Metoprolol which improved her symptoms. Also, the health problems in her family resolved as well.   She drinks 3 cups of coffee daily.  No ETOH.  She smokes 1 PPD.      She has been off of her metoprolol for the last 5 days in anticipation of her ablation.  She has had multiple episodes of SVT while off of her metoprolol, some lasting a few seconds, some episodes lasting a few hours.  She  has not attempted any vagal maneuvers to break the rhythm, she just sits down and breaths.  She also reports chest pain which she has been experiencing for months and which has not changed in nature or severity.  States it is a dull pain, midsternal radiating to her back.  Does not occur with exertion.  Can walk 2 1/2 miles without any symptoms.  Usually will occur at rest and will last an hour then resolve on its own.  Denies associated lightheadedness or SOB.  She had a normal stress test 7 years ago.        Prescriptions Prior to Admission   Medication Sig Dispense Refill Last Dose   • raNITIdine (ZANTAC) 150 MG tablet TAKE ONE TABLET BY MOUTH TWICE DAILY (Patient taking differently: TAKE ONE TABLET BY MOUTH prn) 60 tablet 5 Past Week at Unknown time   • metoprolol tartrate (LOPRESSOR) 25 MG tablet Take 1/2 tablet twice a day (Patient taking differently: Take 12.5 mg by mouth 2 (Two) Times a Day. Take 1/2 tablet twice a day) 60 tablet 5 11/1/2017       Current Meds  No current facility-administered medications on file prior to encounter.      No current outpatient prescriptions on file prior to encounter.         Social History     Social History   • Marital status:      Spouse name: N/A   • Number of children: 2   • Years of education: N/A     Occupational History   • Not on file.     Social History Main Topics   • Smoking status: Current Every Day Smoker     Packs/day: 1.00     Years: 25.00     Types: Cigarettes   • Smokeless tobacco: Never Used      Comment: on and off since teenager   • Alcohol use No   • Drug use: No   • Sexual activity: Defer     Other Topics Concern   • Not on file     Social History Narrative    Patient consumes 2-3 cups coffee daily.     Patient lives at home with .          Family History:     Heart attack Mother     • Coronary artery disease Mother     • Diabetes Mother     • Hypertension Mother     • Hypertension Father     • Hypertension Sister     • Diabetes Sister    "  • Hypertension Brother     • Diabetes Brother     • Hypertension Maternal Grandmother     • Hypertension Maternal Grandfather     • No Known Problems Paternal Grandmother     • No Known Problems Paternal Grandfather        REVIEW OF SYSTEMS:   CONST:  No weight loss, fever, chills, weakness or fatigue.   HEENT:  No visual loss, blurred vision, double vision, yellow sclerae.                   No hearing loss, congestion, sore throat.   SKIN:      No rashes, urticaria, ulcers, sores.     RESP:     No shortness of breath, hemoptysis, cough, sputum.   GI:           No anorexia, nausea, vomiting, diarrhea. No abdominal pain, melena.   :         No burning on urination, hematuria or increased frequency.  ENDO:    No diaphoresis, cold or heat intolerance. No polyuria or polydipsia.   NEURO:  No headache, dizziness, syncope, paralysis, ataxia, or parasthesias.                  No change in bowel or bladder control. No history of CVA/TIA  MUSC:    No muscle, back pain, joint pain or stiffness.   HEME:    No anemia, bleeding, bruising. No history of DVT/PE.  PSYCH:  No history of depression, anxiety      Objective:     Vitals:    11/09/17 1200   BP: 102/77   BP Location: Left arm   Patient Position: Lying   Pulse: 66   Resp: 18   Temp: 98.2 °F (36.8 °C)   TempSrc: Temporal Artery    SpO2: 100%   Weight: 192 lb 7.4 oz (87.3 kg)   Height: 66\" (167.6 cm)     Body mass index is 31.06 kg/(m^2).  Flowsheet Rows         First Filed Value    Admission Height  66\" (167.6 cm) Documented at 11/09/2017 1200    Admission Weight  192 lb 7.4 oz (87.3 kg) Documented at 11/09/2017 1200          General Appearance:    Alert, cooperative, in no acute distress   Head:    Normocephalic, without obvious abnormality, atraumatic   Eyes:            Lids and lashes normal, conjunctivae and sclerae normal, no   icterus, no pallor, corneas clear, PERRLA       Throat:   No oral lesions, no thrush, oral mucosa moist   Neck:   No adenopathy, supple, " trachea midline, no thyromegaly, no   carotid bruit, no JVD       Lungs:     Clear to auscultation,respirations regular, even and unlabored    Heart:    Regular rhythm and normal rate, normal S1 and S2, no murmur, no gallop, no rub, no click   Chest Wall:    No abnormalities observed   Abdomen:     Normal bowel sounds, no masses, no organomegaly, soft        non-tender, non-distended, no guarding, no rebound  tenderness   Extremities:   Moves all extremities well, no edema, no cyanosis, no redness   Pulses:   Pulses palpable and equal bilaterally. Normal radial, carotid, femoral, dorsalis pedis and posterior tibial pulses bilaterally. Normal abdominal aorta   Skin:   No bleeding, bruising or rash       Psychiatric:   Alert and oented x 3, normal mood and affect.     Vital Sign Min/Max for last 24 hours  Temp  Min: 98.2 °F (36.8 °C)  Max: 98.2 °F (36.8 °C)   BP  Min: 102/77  Max: 102/77   Pulse  Min: 66  Max: 66   Resp  Min: 18  Max: 18   SpO2  Min: 100 %  Max: 100 %   No Data Recorded    No intake or output data in the 24 hours ending 11/09/17 1211        Lab Review:     Lab Results   Component Value Date    GLUCOSE 84 10/30/2017    CALCIUM 9.2 10/30/2017     10/30/2017    K 4.8 10/30/2017    CO2 30.0 10/30/2017     10/30/2017    BUN 16 10/30/2017    CREATININE 0.70 10/30/2017    EGFRIFAFRI 104 10/30/2017    EGFRIFNONA 86 10/30/2017    BCR 22.9 10/30/2017    ANIONGAP 3.0 10/30/2017   ;  Lab Results   Component Value Date    WBC 7.91 10/30/2017    HGB 14.4 10/30/2017    HCT 44.1 (H) 10/30/2017    MCV 93.8 10/30/2017     10/30/2017     Lab Results   Component Value Date    INR 0.98 10/30/2017    PROTIME 10.7 10/30/2017        Tele: NSR    Assessment/Plan:    1.) Supraventricular tachycardia:     -Patient with noted SVT per monitor, most likely AVNRT.  Patient will undergo EPS +/- RFA SVT.  The risks, benefits, and alternatives of the procedure have been reviewed and the patient wishes to proceed.      2.) Tobacco abuse:    -Patient counseled on cessation measures.    3.) Atypical chest pain:    -Does not appear to be anginal in nature, patient report of a normal stress test a few years ago, will monitor for now    SILVIO Javed  [facility]  11/09/17  12:11 PM      I, Manny Grimes MD, personally performed the services face to face as described and documented by the above named individual. I have made any necessary edits and it is both accurate and complete 11/9/2017  4:57 PM

## 2017-11-10 VITALS
WEIGHT: 192.46 LBS | OXYGEN SATURATION: 98 % | TEMPERATURE: 97.7 F | HEART RATE: 70 BPM | HEIGHT: 66 IN | DIASTOLIC BLOOD PRESSURE: 67 MMHG | SYSTOLIC BLOOD PRESSURE: 102 MMHG | RESPIRATION RATE: 15 BRPM | BODY MASS INDEX: 30.93 KG/M2

## 2017-11-10 LAB — MACROSCOPIC EXAM: NORMAL

## 2017-11-10 PROCEDURE — 99225 PR SBSQ OBSERVATION CARE/DAY 25 MINUTES: CPT | Performed by: INTERNAL MEDICINE

## 2017-11-10 PROCEDURE — 87169 MACROSCOPIC EXAM PARASITE: CPT | Performed by: INTERNAL MEDICINE

## 2017-11-10 PROCEDURE — G0378 HOSPITAL OBSERVATION PER HR: HCPCS

## 2017-11-10 PROCEDURE — 93005 ELECTROCARDIOGRAM TRACING: CPT | Performed by: INTERNAL MEDICINE

## 2017-11-10 PROCEDURE — 93010 ELECTROCARDIOGRAM REPORT: CPT | Performed by: INTERNAL MEDICINE

## 2017-11-10 NOTE — PROGRESS NOTES
"[facility]  Progress Note     LOS: 1 day   Patient Care Team:  Quinn Gonzales MD as PCP - General    Chief Complaint:  SVT    Subjective     Feels well. No SOB. Feels much better, can already tell a difference in how she feels. Doing well.    Review of Systems:   Pertinent positives in HPI, all others reviewed and negative.      Objective       Current Facility-Administered Medications:   •  famotidine (PEPCID) tablet 20 mg, 20 mg, Oral, BID, SILVIO Javed  •  HYDROcodone-acetaminophen (NORCO) 5-325 MG per tablet 1 tablet, 1 tablet, Oral, Q4H PRN, Manny Grimes MD  •  ondansetron (ZOFRAN) injection 4 mg, 4 mg, Intravenous, Q6H PRN, Manny Grimes MD    Vital Sign Min/Max for last 24 hours  Temp  Min: 97.7 °F (36.5 °C)  Max: 98.2 °F (36.8 °C)   BP  Min: 95/61  Max: 142/78   Pulse  Min: 63  Max: 94   Resp  Min: 11  Max: 18   SpO2  Min: 95 %  Max: 100 %   Flow (L/min)  Min: 2  Max: 2   Weight  Min: 192 lb 7.4 oz (87.3 kg)  Max: 192 lb 7.4 oz (87.3 kg)     Flowsheet Rows         First Filed Value    Admission Height  66\" (167.6 cm) Documented at 11/09/2017 1200    Admission Weight  192 lb 7.4 oz (87.3 kg) Documented at 11/09/2017 1200          Physical Exam:     General Appearance:    Alert, cooperative, in no acute distress   Lungs:     CTA bilaterally    Heart:    RRR NL S1 S2   Chest Wall:    No abnormalities observed   Abdomen:     Normal bowel sounds, no masses,  soft  non-tender, non-distended, no guarding, no rebound tenderness   Extremities:   Moves all extremities well, no edema, no cyanosis, no             redness   Pulses:   Pulses palpable and equal bilaterally   Skin:   Groin sites are clean (right groin and neck), dry and intact. No redness, swelling or drainage. No hematoma        Results Review:                                    No intake or output data in the 24 hours ending 11/10/17 0708    I personally viewed and interpreted the patient's " EKG/Telemetry data    EKG: NSR 71 bpm, normal OK, QRS and QT intervals    Telemetry: NSR 63-94      Present on Admission:  • SVT (supraventricular tachycardia)    Assessment/Plan     1. AT: S/p right AT ablation x 3. Pt has done well over night. Medications, wound care and follow have been reviewed with the patient.   2. Tobacco abuse: Patient counseled on cessation measures. Clinically stable     3. Atypical chest pain: Does not appear to be anginal in nature, patient report of a normal stress test a few years ago, will monitor for now. Pain improved this AM.     Plan for disposition: The patient is stable and will be discharged to home  Today with plan to follow up with Dr. Grimes in 3 months.   NATASHA Hart  11/10/17  7:08 AM      I, Manny Grimes MD, personally performed the services face to face as described and documented by the above named individual. I have made any necessary edits and it is both accurate and complete 11/10/2017  8:32 AM

## 2018-03-21 ENCOUNTER — OFFICE VISIT (OUTPATIENT)
Dept: CARDIOLOGY | Facility: CLINIC | Age: 58
End: 2018-03-21

## 2018-03-21 VITALS
BODY MASS INDEX: 29.32 KG/M2 | SYSTOLIC BLOOD PRESSURE: 106 MMHG | HEIGHT: 66 IN | WEIGHT: 182.4 LBS | OXYGEN SATURATION: 99 % | DIASTOLIC BLOOD PRESSURE: 62 MMHG | HEART RATE: 87 BPM

## 2018-03-21 DIAGNOSIS — I47.1 SVT (SUPRAVENTRICULAR TACHYCARDIA) (HCC): Primary | ICD-10-CM

## 2018-03-21 PROCEDURE — 93000 ELECTROCARDIOGRAM COMPLETE: CPT | Performed by: INTERNAL MEDICINE

## 2018-03-21 PROCEDURE — 99213 OFFICE O/P EST LOW 20 MIN: CPT | Performed by: INTERNAL MEDICINE

## 2018-03-21 RX ORDER — AMOXICILLIN 500 MG/1
CAPSULE ORAL 3 TIMES DAILY
COMMUNITY
Start: 2018-03-08 | End: 2018-03-26

## 2018-03-21 NOTE — PROGRESS NOTES
"Dede Jerez  1960  [phone]      03/21/2018    Location:    Quinn Gonzales MD  [address] [place] [id number]    Chief Complaint   Patient presents with   • Rapid Heart Rate       Problem List:  1. SVT              A. Echocardiogram 2/8/17: EF 65%, mild TR              B. Event Monitor 1/27/17-2/20/17: runs of SVT at 150 bpm              C. EPS with RFA of AT x 3 11/19/17  2. Anxiety  3. Tobacco Abuse  4. GERD  5. Arthritis      Allergies  Allergies   Allergen Reactions   • Biaxin [Clarithromycin] Confusion     TABS       Current Medications    Current Outpatient Prescriptions:   •  amoxicillin (AMOXIL) 500 MG capsule, 3 (Three) Times a Day., Disp: , Rfl:   •  aspirin 81 MG tablet, Take 81 mg by mouth As Needed., Disp: 30 tablet, Rfl: 11  •  raNITIdine (ZANTAC) 150 MG tablet, TAKE ONE TABLET BY MOUTH TWICE DAILY (Patient taking differently: TAKE ONE TABLET BY MOUTH prn), Disp: 60 tablet, Rfl: 5    History of Present Illness   HPI    Pt presents for follow up of SVT.  Since the pt has seen us in clinic last, pt underwent RFA of AT x 3. Since the ablation, she feels great. No tachycardia for past two months. Denies any syncope, SOB, CP, LH, and dizziness. Denies any hospitalizations, ER visits, bleeding, or TIA/CVA symptoms. Overall feels well. Still smokes    ROS:  General:  Denies fatigue, weight gain or loss  Cardiovascular:  Denies CP, PND, syncope, near syncope, edema or palpitations.  Pulmonary:  Denies HARDWICK, cough, or wheezing    Vitals:    03/21/18 1503   BP: 106/62   BP Location: Left arm   Patient Position: Sitting   Pulse: 87   SpO2: 99%   Weight: 82.7 kg (182 lb 6.4 oz)   Height: 167.6 cm (66\")       PE:  General: NAD  Neck: no JVD, no carotid bruits, no TM  Heart RRR, NL S1, S2, no rubs, murmurs  Lungs: CTA, no wheezes, rhonchi, or rales  Abd: soft, non-tender, NL BS  Ext: No musculoskeletal deformities, no edema, cyanosis, or clubbing  Psych: normal mood and affect    Diagnostic " Data:    ECG 12 Lead  Date/Time: 3/21/2018 3:24 PM  Performed by: DERRICK LEHMAN  Authorized by: DERRICK LEHMAN   Comparison: compared with previous ECG from 11/10/2017  Similar to previous ECG  Rhythm: sinus rhythm  BPM: 92              1. SVT (supraventricular tachycardia)        Plan:  1) SVT, PVC's s/p RFA (AT x 3)  Doing well, Feels good    2) Anticoagulation  Continue ASA  2) Tobacco abuse: Stop    F/up in 9 months

## 2018-03-26 ENCOUNTER — OFFICE VISIT (OUTPATIENT)
Dept: FAMILY MEDICINE CLINIC | Facility: CLINIC | Age: 58
End: 2018-03-26

## 2018-03-26 VITALS
BODY MASS INDEX: 29.25 KG/M2 | SYSTOLIC BLOOD PRESSURE: 108 MMHG | WEIGHT: 182 LBS | DIASTOLIC BLOOD PRESSURE: 68 MMHG | RESPIRATION RATE: 18 BRPM | TEMPERATURE: 98 F | HEIGHT: 66 IN | HEART RATE: 76 BPM

## 2018-03-26 DIAGNOSIS — I47.1 SVT (SUPRAVENTRICULAR TACHYCARDIA) (HCC): Primary | ICD-10-CM

## 2018-03-26 DIAGNOSIS — F41.1 GAD (GENERALIZED ANXIETY DISORDER): ICD-10-CM

## 2018-03-26 PROCEDURE — 99213 OFFICE O/P EST LOW 20 MIN: CPT | Performed by: FAMILY MEDICINE

## 2018-03-26 NOTE — PROGRESS NOTES
Subjective   Dede Jerez is a 57 y.o. female.     History of Present Illness     She saw Dr. Grimes who did ablation 11/2017and her heart has been doing great since that time  She has had a few palpitations but not as severe.  Last palps were over a month ago    She has had issues with her teeth and they are going to be pulled    She is only taking zantac PRN, just when she needs it    Her mod has been doing well also  Dealing with her husbands cancer  She is doing counseling  Does not need any medicine at this time    The following portions of the patient's history were reviewed and updated as appropriate: allergies, current medications, past family history, past medical history, past social history, past surgical history and problem list.    Review of Systems   Constitutional: Negative.        Objective   Physical Exam   Constitutional: She appears well-developed and well-nourished. No distress.   Cardiovascular: Normal rate, regular rhythm and normal heart sounds.    Pulmonary/Chest: Effort normal and breath sounds normal.   Psychiatric: She has a normal mood and affect. Her behavior is normal.   Nursing note and vitals reviewed.      Assessment/Plan   Dede was seen today for follow-up.    Diagnoses and all orders for this visit:    SVT (supraventricular tachycardia)    MICHAEL (generalized anxiety disorder)    no issues with SVT, happy with results from   Keo has been doing well also, no need for medicine

## 2018-04-24 ENCOUNTER — TELEPHONE (OUTPATIENT)
Dept: FAMILY MEDICINE CLINIC | Facility: CLINIC | Age: 58
End: 2018-04-24

## 2018-04-24 RX ORDER — FLUCONAZOLE 200 MG/1
TABLET ORAL
Qty: 2 TABLET | Refills: 0 | Status: SHIPPED | OUTPATIENT
Start: 2018-04-24 | End: 2018-05-15

## 2018-04-24 NOTE — TELEPHONE ENCOUNTER
----- Message from Sana Carty sent at 4/24/2018 11:18 AM EDT -----  Contact: morrin ; med requesti   Pt called in wanting to know if she can have something for a yeast infection she would like it sent to pharmacy at [facility]

## 2018-05-15 ENCOUNTER — OFFICE VISIT (OUTPATIENT)
Dept: FAMILY MEDICINE CLINIC | Facility: CLINIC | Age: 58
End: 2018-05-15

## 2018-05-15 VITALS
DIASTOLIC BLOOD PRESSURE: 74 MMHG | TEMPERATURE: 97.8 F | SYSTOLIC BLOOD PRESSURE: 104 MMHG | HEART RATE: 74 BPM | WEIGHT: 179 LBS | HEIGHT: 66 IN | RESPIRATION RATE: 18 BRPM | BODY MASS INDEX: 28.77 KG/M2

## 2018-05-15 DIAGNOSIS — R42 VERTIGO: Primary | ICD-10-CM

## 2018-05-15 PROCEDURE — 99214 OFFICE O/P EST MOD 30 MIN: CPT | Performed by: FAMILY MEDICINE

## 2018-05-15 RX ORDER — MECLIZINE HYDROCHLORIDE 25 MG/1
TABLET ORAL
Qty: 40 TABLET | Refills: 1 | Status: SHIPPED | OUTPATIENT
Start: 2018-05-15 | End: 2018-09-12

## 2018-05-15 NOTE — PROGRESS NOTES
Subjective   Dede Jerez is a 57 y.o. female.     History of Present Illness     When she bent over she felt really dizzy  When she lays down at night she will feel dizzy as her head is being layed down  She has a hard time closing her eyes as she feels dizzy  Anytime she walks she has a hard time walking a straight line  This has been going on longer than a year  No real dizziness looking to right or left but always when head leaning forward or backwards    She has had neck pain for a long time  Her former specialist told her that PT would help this and it has worked      The following portions of the patient's history were reviewed and updated as appropriate: allergies, current medications, past family history, past medical history, past social history, past surgical history and problem list.    Review of Systems   Constitutional: Negative.    HENT: Negative.  Negative for hearing loss and tinnitus.    Eyes: Negative.  Negative for visual disturbance.   Respiratory: Negative.  Negative for shortness of breath.    Cardiovascular: Negative.  Negative for chest pain.   Gastrointestinal: Negative.    Musculoskeletal: Negative.    Skin: Negative.    Neurological: Positive for dizziness. Negative for headaches.   Psychiatric/Behavioral: Negative.    All other systems reviewed and are negative.      Objective   Physical Exam   Constitutional: She is oriented to person, place, and time. She appears well-developed and well-nourished. No distress.   HENT:   Head: Normocephalic and atraumatic.   Right Ear: Tympanic membrane, external ear and ear canal normal.   Left Ear: Tympanic membrane, external ear and ear canal normal.   Nose: Nose normal.   Mouth/Throat: Uvula is midline and oropharynx is clear and moist.   Eyes: Conjunctivae and EOM are normal. Pupils are equal, round, and reactive to light.   No nystagmus   Neck: Normal range of motion. Neck supple. No thyromegaly present.   Cardiovascular: Normal rate, regular  rhythm and normal heart sounds.    No murmur heard.  Pulmonary/Chest: Effort normal and breath sounds normal. No respiratory distress.   Abdominal: Soft. Bowel sounds are normal. She exhibits no distension and no mass. There is no tenderness.   Lymphadenopathy:     She has no cervical adenopathy.   Neurological: She is alert and oriented to person, place, and time.   Normal romberg and no pronator drift.  Gait stable.   Skin: Skin is warm and dry.   Psychiatric: She has a normal mood and affect. Her behavior is normal. Judgment and thought content normal.   Nursing note and vitals reviewed.      Assessment/Plan   Dede was seen today for dizziness.    Diagnoses and all orders for this visit:    Vertigo  -     MRI Brain Without Contrast; Future  -     meclizine (ANTIVERT) 25 MG tablet; 1/2 to 1 to 2 pills as needed every 4 hours for dizziness    vertigo of uncertain etiology.  Will check MRI of brain, meclizine and epley maneuvers at home.  Will follow up pending MRI and consider formal PT in the future.

## 2018-05-18 ENCOUNTER — HOSPITAL ENCOUNTER (OUTPATIENT)
Dept: MRI IMAGING | Facility: HOSPITAL | Age: 58
Discharge: HOME | End: 2018-05-18
Admitting: FAMILY MEDICINE

## 2018-05-18 DIAGNOSIS — R42 VERTIGO: ICD-10-CM

## 2018-05-18 PROCEDURE — 70551 MRI BRAIN STEM W/O DYE: CPT

## 2018-05-21 DIAGNOSIS — R90.89 ABNORMAL BRAIN MRI: Primary | ICD-10-CM

## 2018-06-05 ENCOUNTER — OFFICE VISIT (OUTPATIENT)
Dept: NEUROLOGY | Facility: CLINIC | Age: 58
End: 2018-06-05

## 2018-06-05 VITALS
WEIGHT: 179 LBS | DIASTOLIC BLOOD PRESSURE: 80 MMHG | HEIGHT: 66 IN | BODY MASS INDEX: 28.77 KG/M2 | SYSTOLIC BLOOD PRESSURE: 111 MMHG

## 2018-06-05 DIAGNOSIS — R42 VERTIGO: Primary | ICD-10-CM

## 2018-06-05 PROCEDURE — 99244 OFF/OP CNSLTJ NEW/EST MOD 40: CPT | Performed by: PSYCHIATRY & NEUROLOGY

## 2018-06-05 RX ORDER — RANITIDINE 150 MG/1
150 TABLET ORAL 2 TIMES DAILY
COMMUNITY
End: 2018-09-12 | Stop reason: SDUPTHER

## 2018-06-05 NOTE — PROGRESS NOTES
Subjective:    CC: Dede Jerez is seen today in consultation at the request of Quinn Gonzales MD for abnormal MRI       HPI:  Patient is a 57-year-old female without any significant past medical history referred to the clinic for dizziness as well as recent MRI brain showing the abnormal findings.  She reports that she started having the dizziness or over an year now.  She reports dizziness as the spinning of her environment.  This is triggered by turning head to the left.  Whenever she tries to get out of the bed or rolling out of the bed it would cause spinning.  She also gets the spinning when she closes her eyes.  For this, she recently underwent MRI brain without contrast which I reviewed personally in detail.  It revealed small T2 hyperintensity within the deep white matter in bilateral cerebral hemisphere as well as larger T2 hyperintensity involving the left frontal lobe and also in the luli.  She does report long-term history of smoking as well as head trauma in the past.  She was told to start doing Epley's maneuver at home and she tried it and it has helped a little bit with the vertigo.  She hasn't seen  physical therapist for vestibular therapy for ongoing symptoms.  She was also prescribed meclizine which seems to have helped.  She was taking it to 2-3 times in a day but now she is taking it only as needed.  She denies headaches, visual changes or any other focal neurological signs or symptoms with  dizziness.    The following portions of the patient's history were reviewed today and updated as of 06/05/2018  : allergies, social history and problem list.  This document will be scanned to patient's chart.      Current Outpatient Prescriptions:   •  aspirin 81 MG tablet, Take 81 mg by mouth As Needed., Disp: 30 tablet, Rfl: 11  •  meclizine (ANTIVERT) 25 MG tablet, 1/2 to 1 to 2 pills as needed every 4 hours for dizziness, Disp: 40 tablet, Rfl: 1  •  raNITIdine (ZANTAC) 150 MG tablet, Take 150 mg by  "mouth 2 (Two) Times a Day., Disp: , Rfl:    Past Medical History:   Diagnosis Date   • Acid reflux    • Anxiety     panic attack related throat constricting making swallowing hard at times    • Arrhythmia    • Cataract     bilat - mild    • Constipation     in past with dieting but fine now    • High cholesterol    • Measles    • Menopause    • Stroke    • TIA (transient ischemic attack) 2010   • Wears glasses    • Wears partial dentures     bottom      Past Surgical History:   Procedure Laterality Date   • CARDIAC ELECTROPHYSIOLOGY PROCEDURE N/A 11/9/2017    Procedure: Ablation SVT, Hold Metoprolol for 5 days prior;  Surgeon: Manny Grimes MD;  Location: [facility] INVASIVE LOCATION;  Service:    • CARPAL TUNNEL RELEASE Bilateral    • COLONOSCOPY     • TUBAL ABDOMINAL LIGATION  1980      Family History   Problem Relation Age of Onset   • Heart attack Mother    • Coronary artery disease Mother    • Diabetes Mother    • Hypertension Mother    • Hypertension Father    • Hypertension Sister    • Diabetes Sister    • Hypertension Brother    • Diabetes Brother    • Hypertension Maternal Grandmother    • Hypertension Maternal Grandfather    • No Known Problems Paternal Grandmother    • No Known Problems Paternal Grandfather       Review of Systems   Constitutional: Negative.    HENT: Negative.    Eyes: Negative.    Respiratory: Negative.    Cardiovascular: Negative.    Musculoskeletal: Negative.    Skin: Negative.    Allergic/Immunologic: Negative.    Neurological: Negative.    Psychiatric/Behavioral: Negative.      Objective:    /80   Ht 167.6 cm (65.98\")   Wt 81.2 kg (179 lb)   LMP  (LMP Unknown)   BMI 28.91 kg/m²     Neurology Exam:  General apperance: NAD.     Mental status: Alert, awake and oriented to time place and person.    Recent and Remote memory: Can recall 3/3 objects at 5 minutes. Can recall historical events.     Attention span and Concentration: Serial 7s: Normal.     Fund of knowledge:  " Normal.     Language and Speech: No aphasia or dysarthria.    Naming , Repitition and Comprehension:  Can name objects, repeat a sentence and follow commands. Speech is clear and fluent with good repetition, comprehension, and naming.    CN II to XII: Intact.    Opthalmoscopic Exam: No papilledema.    Motor:  Right UE muscle strength 5/5. Normal tone.     Left UE muscle strength 5/5. Normal tone.      Right LE muscle strength5/5. Normal tone.     Left LE muscle strength 5/5. Normal tone.      Sensory: Normal light touch, vibration and pinprick sensation bilaterally.    DTRs: 2+ bilaterally.    Babinski: Negative bilaterally.    Co-ordination: Normal finger-to-nose, heel to shin B/L.    Rhomberg: Negative.    Gait: Normal.    Cardiovascular: Regular rate and rhythm without murmur, gallop or rub.    Assessment and Plan:  1. Vertigo  Likely BPPV based on patient's symptoms.  Will refer her for vestibular therapy and see how she does.  I've advised to continue taking meclizine as needed for now for symptomatically relief.  I'll see her back in 5 weeks in reassessment.  MRI brain was reviewed in detail with the patient and I reassured her that the pill T2 hyperintensities seen on MRI are not related to demyelinating condition such as MS.  They're likely caused by patient's long-term history of smoking and patient's history of head trauma in the past.  - Ambulatory Referral to Physical Therapy Vestibular       Return in about 5 weeks (around 7/10/2018).

## 2018-07-11 ENCOUNTER — OFFICE VISIT (OUTPATIENT)
Dept: NEUROLOGY | Facility: CLINIC | Age: 58
End: 2018-07-11

## 2018-07-11 VITALS
DIASTOLIC BLOOD PRESSURE: 80 MMHG | HEIGHT: 66 IN | BODY MASS INDEX: 28.77 KG/M2 | SYSTOLIC BLOOD PRESSURE: 112 MMHG | WEIGHT: 179 LBS

## 2018-07-11 DIAGNOSIS — R42 VERTIGO: Primary | ICD-10-CM

## 2018-07-11 PROCEDURE — 99213 OFFICE O/P EST LOW 20 MIN: CPT | Performed by: PSYCHIATRY & NEUROLOGY

## 2018-07-11 NOTE — PROGRESS NOTES
Subjective:    CC: Dede Jerez is in clinic today for follow up for  vertigo.    HPI:  She is in clinic for regular follow-up.  Since her last visit, she went for vestibular therapy and it helped resolve vertigo completely.  She had resolution of symptoms after first session.  She reports no more spinning or dizzy spells since her therapy.  She is doing very well.    The following portions of the patient's history were reviewed and updated as of 07/11/2018: allergies, social history and problem list.       Current Outpatient Prescriptions:   •  aspirin 81 MG tablet, Take 81 mg by mouth As Needed., Disp: 30 tablet, Rfl: 11  •  meclizine (ANTIVERT) 25 MG tablet, 1/2 to 1 to 2 pills as needed every 4 hours for dizziness, Disp: 40 tablet, Rfl: 1  •  raNITIdine (ZANTAC) 150 MG tablet, Take 150 mg by mouth 2 (Two) Times a Day., Disp: , Rfl:    Past Medical History:   Diagnosis Date   • Acid reflux    • Anxiety     panic attack related throat constricting making swallowing hard at times    • Arrhythmia    • Cataract     bilat - mild    • Constipation     in past with dieting but fine now    • High cholesterol    • Measles    • Menopause    • Stroke (CMS/HCC)    • TIA (transient ischemic attack) 2010   • Wears glasses    • Wears partial dentures     bottom      Past Surgical History:   Procedure Laterality Date   • CARDIAC ELECTROPHYSIOLOGY PROCEDURE N/A 11/9/2017    Procedure: Ablation SVT, Hold Metoprolol for 5 days prior;  Surgeon: Manny Grimes MD;  Location: [facility] INVASIVE LOCATION;  Service:    • CARPAL TUNNEL RELEASE Bilateral    • COLONOSCOPY     • TUBAL ABDOMINAL LIGATION  1980      Family History   Problem Relation Age of Onset   • Heart attack Mother    • Coronary artery disease Mother    • Diabetes Mother    • Hypertension Mother    • Hypertension Father    • Hypertension Sister    • Diabetes Sister    • Hypertension Brother    • Diabetes Brother    • Hypertension Maternal Grandmother    •  "Hypertension Maternal Grandfather    • No Known Problems Paternal Grandmother    • No Known Problems Paternal Grandfather         Review of Systems   Respiratory: Negative.    Cardiovascular: Negative.    Neurological: Negative.      Objective:    /80   Ht 167.6 cm (65.98\")   Wt 81.2 kg (179 lb)   LMP  (LMP Unknown)   BMI 28.91 kg/m²     Neurology Exam:  General apperance: NAD.     Mental status: Alert, awake and oriented to time place and person.    Recent and Remote memory: Can recall 3/3 objects at 5 minutes. Can recall historical events.     Attention span and Concentration: Serial 7s: Normal.     Fund of knowledge:  Normal.     Language and Speech: No aphasia or dysarthria.    Naming , Repitition and Comprehension:  Can name objects, repeat a sentence and follow commands. Speech is clear and fluent with good repetition, comprehension, and naming.    CN II to XII: Intact.    Opthalmoscopic Exam: No papilledema.    Motor:  Right UE muscle strength 5/5. Normal tone.     Left UE muscle strength 5/5. Normal tone.      Right LE muscle strength5/5. Normal tone.     Left LE muscle strength 5/5. Normal tone.      Sensory: Normal light touch, vibration and pinprick sensation bilaterally.    DTRs: 2+ bilaterally.    Babinski: Negative bilaterally.    Co-ordination: Normal finger-to-nose, heel to shin B/L.    Rhomberg: Negative.    Gait: Normal.    Cardiovascular: Regular rate and rhythm without murmur, gallop or rub.      Assessment and Plan:  1. Vertigo  BPPV.  She had complete resolution of vertigo after first session of vestibular therapy.  She is doing very well.  I'll see her in clinic as needed.    I spent 15 minutes face to face with the patient and spent 10 minutes of this time counseling and discussing about importance of the regular exercise, good sleep hygiene, good hydration .     "

## 2018-09-12 ENCOUNTER — OFFICE VISIT (OUTPATIENT)
Dept: FAMILY MEDICINE CLINIC | Facility: CLINIC | Age: 58
End: 2018-09-12

## 2018-09-12 VITALS
HEART RATE: 83 BPM | RESPIRATION RATE: 14 BRPM | OXYGEN SATURATION: 99 % | DIASTOLIC BLOOD PRESSURE: 70 MMHG | TEMPERATURE: 98.2 F | BODY MASS INDEX: 27.48 KG/M2 | SYSTOLIC BLOOD PRESSURE: 104 MMHG | WEIGHT: 171 LBS | HEIGHT: 66 IN

## 2018-09-12 DIAGNOSIS — Z13.6 ENCOUNTER FOR LIPID SCREENING FOR CARDIOVASCULAR DISEASE: ICD-10-CM

## 2018-09-12 DIAGNOSIS — E55.9 VITAMIN D DEFICIENCY: ICD-10-CM

## 2018-09-12 DIAGNOSIS — Z11.59 NEED FOR HEPATITIS C SCREENING TEST: ICD-10-CM

## 2018-09-12 DIAGNOSIS — H65.03 BILATERAL ACUTE SEROUS OTITIS MEDIA, RECURRENCE NOT SPECIFIED: ICD-10-CM

## 2018-09-12 DIAGNOSIS — F17.210 CIGARETTE NICOTINE DEPENDENCE WITHOUT COMPLICATION: ICD-10-CM

## 2018-09-12 DIAGNOSIS — K21.9 GASTROESOPHAGEAL REFLUX DISEASE WITHOUT ESOPHAGITIS: ICD-10-CM

## 2018-09-12 DIAGNOSIS — R09.81 SINUS CONGESTION: Primary | ICD-10-CM

## 2018-09-12 DIAGNOSIS — R53.83 FATIGUE, UNSPECIFIED TYPE: ICD-10-CM

## 2018-09-12 DIAGNOSIS — Z13.220 ENCOUNTER FOR LIPID SCREENING FOR CARDIOVASCULAR DISEASE: ICD-10-CM

## 2018-09-12 PROCEDURE — 99214 OFFICE O/P EST MOD 30 MIN: CPT | Performed by: PHYSICIAN ASSISTANT

## 2018-09-12 PROCEDURE — 99406 BEHAV CHNG SMOKING 3-10 MIN: CPT | Performed by: PHYSICIAN ASSISTANT

## 2018-09-12 RX ORDER — CEFDINIR 300 MG/1
300 CAPSULE ORAL 2 TIMES DAILY
Qty: 20 CAPSULE | Refills: 0 | Status: SHIPPED | OUTPATIENT
Start: 2018-09-12 | End: 2019-01-10

## 2018-09-12 RX ORDER — VARENICLINE TARTRATE 1 MG/1
1 TABLET, FILM COATED ORAL 2 TIMES DAILY
Qty: 60 TABLET | Refills: 2 | Status: SHIPPED | OUTPATIENT
Start: 2018-09-12 | End: 2019-01-10

## 2018-09-12 RX ORDER — FLUTICASONE PROPIONATE 50 MCG
2 SPRAY, SUSPENSION (ML) NASAL DAILY
Qty: 9.9 ML | Refills: 0 | Status: SHIPPED | OUTPATIENT
Start: 2018-09-12 | End: 2019-01-10

## 2018-09-12 RX ORDER — RANITIDINE 150 MG/1
150 TABLET ORAL 2 TIMES DAILY
Qty: 60 TABLET | Refills: 5 | Status: SHIPPED | OUTPATIENT
Start: 2018-09-12 | End: 2020-04-23

## 2018-09-12 NOTE — PROGRESS NOTES
Subjective   Dede Jerez is a 58 y.o. female.     History of Present Illness   Pt presents with CC of sinus congestion on R side along with R ear pain, some dizziness and HA.   Been very fatigued. Gets only about 4 hours of sleep a night and it is interrupted (caring for her  who has cancer )  Needs refill on heartburn medication sent to pharmacy.   Would like labs today   Interested in quitting smoking. Has done well on chantix in the past. Went back to smoking due to stress. Was not on long     The following portions of the patient's history were reviewed and updated as appropriate: allergies, current medications, past family history, past medical history, past social history, past surgical history and problem list.    Review of Systems   Constitutional: Positive for fatigue. Negative for chills, diaphoresis and fever.   HENT: Positive for congestion, ear pain, postnasal drip and sinus pressure. Negative for ear discharge, hearing loss, nosebleeds, sneezing and sore throat.    Eyes: Negative.    Respiratory: Negative.  Negative for cough, chest tightness, shortness of breath and wheezing.    Cardiovascular: Negative.  Negative for chest pain, palpitations and leg swelling.   Gastrointestinal: Negative for abdominal distention, abdominal pain, anal bleeding, blood in stool, constipation, diarrhea, nausea, rectal pain and vomiting.   Endocrine: Negative.  Negative for cold intolerance, heat intolerance, polydipsia, polyphagia and polyuria.   Genitourinary: Negative.  Negative for difficulty urinating, dysuria, flank pain, frequency, hematuria and urgency.   Musculoskeletal: Negative.  Negative for arthralgias, back pain, gait problem, joint swelling, myalgias, neck pain and neck stiffness.   Skin: Negative.  Negative for color change, pallor, rash and wound.   Allergic/Immunologic: Negative.  Negative for immunocompromised state.   Neurological: Positive for dizziness and headaches. Negative for syncope,  "weakness, light-headedness and numbness.   Hematological: Negative.  Negative for adenopathy. Does not bruise/bleed easily.   Psychiatric/Behavioral: Positive for sleep disturbance.       Objective    Blood pressure 104/70, pulse 83, temperature 98.2 °F (36.8 °C), temperature source Temporal Artery , resp. rate 14, height 167.6 cm (65.98\"), weight 77.6 kg (171 lb), SpO2 99 %.     Physical Exam   Constitutional: She is oriented to person, place, and time. She appears well-developed and well-nourished.   HENT:   Head: Normocephalic and atraumatic.   Right Ear: External ear and ear canal normal. Tympanic membrane is retracted. A middle ear effusion is present.   Left Ear: External ear and ear canal normal. Tympanic membrane is erythematous and retracted.   Nose: Mucosal edema present. Right sinus exhibits no maxillary sinus tenderness and no frontal sinus tenderness. Left sinus exhibits no maxillary sinus tenderness and no frontal sinus tenderness.   Mouth/Throat: Oropharynx is clear and moist. No oropharyngeal exudate, posterior oropharyngeal edema or posterior oropharyngeal erythema.   Eyes: Pupils are equal, round, and reactive to light. Conjunctivae and EOM are normal.   Neck: Normal range of motion. Neck supple. No tracheal deviation present. No thyromegaly present.   Cardiovascular: Normal rate, regular rhythm, normal heart sounds and intact distal pulses.  Exam reveals no gallop and no friction rub.    No murmur heard.  Pulmonary/Chest: Effort normal and breath sounds normal. No respiratory distress. She has no wheezes. She has no rales. She exhibits no tenderness.   Abdominal: Soft. Bowel sounds are normal. She exhibits no distension and no mass. There is no tenderness. There is no rebound and no guarding. No hernia.   Lymphadenopathy:     She has no cervical adenopathy.   Neurological: She is alert and oriented to person, place, and time. She has normal reflexes.   Skin: Skin is warm and dry.   Psychiatric: " She has a normal mood and affect. Her behavior is normal. Judgment and thought content normal.   Nursing note and vitals reviewed.      Assessment/Plan   Dede was seen today for dizziness, heartburn, med refill and labs only.    Diagnoses and all orders for this visit:    Sinus congestion  -     fluticasone (FLONASE) 50 MCG/ACT nasal spray; 2 sprays into the nostril(s) as directed by provider Daily.    Fatigue, unspecified type  -     CBC w AUTO Differential  -     Comprehensive metabolic panel  -     TSH  -     T4, free  -     Vitamin B12    Encounter for lipid screening for cardiovascular disease  -     Lipid panel    Vitamin D deficiency  -     Vitamin D 25 hydroxy    Need for hepatitis C screening test  -     Hepatitis C antibody    Gastroesophageal reflux disease without esophagitis  -     raNITIdine (ZANTAC) 150 MG tablet; Take 1 tablet by mouth 2 (Two) Times a Day.    Cigarette nicotine dependence without complication  -     varenicline (CHANTIX STARTING MONTH ADAN) 0.5 MG X 11 & 1 MG X 42 tablet; Take 0.5 mg one daily on days 1-3 and and 0.5 mg twice daily on days 4-7.Then 1 mg twice daily for a total of 12 weeks.  -     varenicline (CHANTIX CONTINUING MONTH ADAN) 1 MG tablet; Take 1 tablet by mouth 2 (Two) Times a Day.    Bilateral acute serous otitis media, recurrence not specified  -     cefdinir (OMNICEF) 300 MG capsule; Take 1 capsule by mouth 2 (Two) Times a Day.    I advised Dede of the risks of continuing to use tobacco, and I provided her with tobacco cessation educational materials in the After Visit Summary.     During this visit, I spent 10 minutes counseling the patient regarding tobacco cessation.

## 2018-09-13 DIAGNOSIS — E55.9 VITAMIN D DEFICIENCY: Primary | ICD-10-CM

## 2018-09-13 LAB
25(OH)D3+25(OH)D2 SERPL-MCNC: 10.4 NG/ML
ALBUMIN SERPL-MCNC: 4.64 G/DL (ref 3.2–4.8)
ALBUMIN/GLOB SERPL: 2.3 G/DL (ref 1.5–2.5)
ALP SERPL-CCNC: 61 U/L (ref 25–100)
ALT SERPL-CCNC: 14 U/L (ref 7–40)
AST SERPL-CCNC: 18 U/L (ref 0–33)
BASOPHILS # BLD AUTO: 0.03 10*3/MM3 (ref 0–0.2)
BASOPHILS NFR BLD AUTO: 0.3 % (ref 0–1)
BILIRUB SERPL-MCNC: 0.4 MG/DL (ref 0.3–1.2)
BUN SERPL-MCNC: 14 MG/DL (ref 9–23)
BUN/CREAT SERPL: 19.7 (ref 7–25)
CALCIUM SERPL-MCNC: 9.7 MG/DL (ref 8.7–10.4)
CHLORIDE SERPL-SCNC: 107 MMOL/L (ref 99–109)
CHOLEST SERPL-MCNC: 202 MG/DL (ref 0–200)
CO2 SERPL-SCNC: 29 MMOL/L (ref 20–31)
CREAT SERPL-MCNC: 0.71 MG/DL (ref 0.6–1.3)
EOSINOPHIL # BLD AUTO: 0.12 10*3/MM3 (ref 0–0.3)
EOSINOPHIL NFR BLD AUTO: 1.3 % (ref 0–3)
ERYTHROCYTE [DISTWIDTH] IN BLOOD BY AUTOMATED COUNT: 13.4 % (ref 11.3–14.5)
GLOBULIN SER CALC-MCNC: 2.1 GM/DL
GLUCOSE SERPL-MCNC: 86 MG/DL (ref 70–100)
HCT VFR BLD AUTO: 46.7 % (ref 34.5–44)
HCV AB S/CO SERPL IA: <0.1 S/CO RATIO (ref 0–0.9)
HDLC SERPL-MCNC: 65 MG/DL (ref 40–60)
HGB BLD-MCNC: 14.7 G/DL (ref 11.5–15.5)
IMM GRANULOCYTES # BLD: 0.02 10*3/MM3 (ref 0–0.03)
IMM GRANULOCYTES NFR BLD: 0.2 % (ref 0–0.6)
LDLC SERPL CALC-MCNC: 120 MG/DL (ref 0–100)
LYMPHOCYTES # BLD AUTO: 2.38 10*3/MM3 (ref 0.6–4.8)
LYMPHOCYTES NFR BLD AUTO: 24.9 % (ref 24–44)
MCH RBC QN AUTO: 30.6 PG (ref 27–31)
MCHC RBC AUTO-ENTMCNC: 31.5 G/DL (ref 32–36)
MCV RBC AUTO: 97.3 FL (ref 80–99)
MONOCYTES # BLD AUTO: 0.61 10*3/MM3 (ref 0–1)
MONOCYTES NFR BLD AUTO: 6.4 % (ref 0–12)
NEUTROPHILS # BLD AUTO: 6.4 10*3/MM3 (ref 1.5–8.3)
NEUTROPHILS NFR BLD AUTO: 66.9 % (ref 41–71)
PLATELET # BLD AUTO: 247 10*3/MM3 (ref 150–450)
POTASSIUM SERPL-SCNC: 5.3 MMOL/L (ref 3.5–5.5)
PROT SERPL-MCNC: 6.7 G/DL (ref 5.7–8.2)
RBC # BLD AUTO: 4.8 10*6/MM3 (ref 3.89–5.14)
SODIUM SERPL-SCNC: 139 MMOL/L (ref 132–146)
T4 FREE SERPL-MCNC: 1.19 NG/DL (ref 0.89–1.76)
TRIGL SERPL-MCNC: 87 MG/DL (ref 0–150)
TSH SERPL DL<=0.005 MIU/L-ACNC: 1.2 MIU/ML (ref 0.35–5.35)
VIT B12 SERPL-MCNC: 485 PG/ML (ref 211–911)
VLDLC SERPL CALC-MCNC: 17.4 MG/DL
WBC # BLD AUTO: 9.56 10*3/MM3 (ref 3.5–10.8)

## 2018-09-13 RX ORDER — ERGOCALCIFEROL 1.25 MG/1
50000 CAPSULE ORAL WEEKLY
Qty: 4 CAPSULE | Refills: 2 | Status: SHIPPED | OUTPATIENT
Start: 2018-09-13 | End: 2019-01-10

## 2019-01-03 PROBLEM — I47.1 ATRIAL TACHYCARDIA (HCC): Status: ACTIVE | Noted: 2019-01-03

## 2019-01-03 PROBLEM — I47.19 ATRIAL TACHYCARDIA: Status: ACTIVE | Noted: 2019-01-03

## 2019-01-03 NOTE — PROGRESS NOTES
Dede COMBS Solitario  1960    There is no work phone number on file.      01/10/2019    Location:    Quinn Gonzales MD  [address] [place] [id number]    Chief Complaint   Patient presents with   • Follow-up       Problem List:  1. SVT              A. Echocardiogram 2/8/17: EF 65%, mild TR              B. Event Monitor 1/27/17-2/20/17: runs of SVT at 150 bpm              C. EPS with RFA of AT x 3 11/19/17  2. Anxiety  3. Tobacco Abuse  4. GERD  5. Arthritis      Allergies  Allergies   Allergen Reactions   • Biaxin [Clarithromycin] Confusion     TABS       Current Medications    Current Outpatient Medications:   •  aspirin 81 MG tablet, Take 81 mg by mouth As Needed., Disp: 30 tablet, Rfl: 11  •  raNITIdine (ZANTAC) 150 MG tablet, Take 1 tablet by mouth 2 (Two) Times a Day. (Patient taking differently: Take 150 mg by mouth As Needed.), Disp: 60 tablet, Rfl: 5    History of Present Illness   HPI    Pt presents for follow up of atrial tachycardia s/p RFA x 3 in 2017.  Since the pt has seen us in clinic last, her  passed away from cancer about a month and a half ago.  She was dealing with quite a bit of stress/anxiety associated with panic attacks after his death.  Her granddaughter has moved in with her which has helped.  She reports her anxiety and panic attacks are improving.  She has had mild chest discomforts associated with her panic attacks but denies any exertional complaints.  No associated SOB, diaphoresis, N/V, dizziness or syncope.  Notes only occasional palpitations but only lasts a few seconds at a time, nothing sustained.  Denies any hospitalizations, ER visits, bleeding, or TIA/CVA symptoms. Overall feels well considering.    ROS:  General:  Denies fatigue, weight gain or loss  Cardiovascular:  Denies CP, PND, syncope, near syncope, edema + occasional palpitations.  Pulmonary:  Denies HARDWICK, cough, or wheezing    Vitals:    01/10/19 1536   BP: 110/68   BP Location: Right arm   Patient  "Position: Sitting   Pulse: 64   SpO2: 98%   Weight: 79.7 kg (175 lb 12.8 oz)   Height: 167.6 cm (66\")       PE:  General: NAD  Neck: no JVD, no carotid bruits, no TM  Heart RRR, NL S1, S2, no rubs, murmurs  Lungs: CTA, no wheezes, rhonchi, or rales  Abd: soft, non-tender, NL BS  Ext: No musculoskeletal deformities, no edema, cyanosis, or clubbing  Psych: normal mood and affect    Diagnostic Data:  Procedures    1. Atrial tachycardia (CMS/HCC)        Plan:  1) Atrial tachycardia:  - S/p RFA of AT x 3, 2017  - Doing well, No recurrence.  Palpitations likely PAC's, will monitor for now.      F/up in 12 months    [facility]  1/10/2019  4:02 PM        "

## 2019-01-10 ENCOUNTER — OFFICE VISIT (OUTPATIENT)
Dept: CARDIOLOGY | Facility: CLINIC | Age: 59
End: 2019-01-10

## 2019-01-10 VITALS
HEIGHT: 66 IN | DIASTOLIC BLOOD PRESSURE: 68 MMHG | SYSTOLIC BLOOD PRESSURE: 110 MMHG | HEART RATE: 64 BPM | BODY MASS INDEX: 28.25 KG/M2 | OXYGEN SATURATION: 98 % | WEIGHT: 175.8 LBS

## 2019-01-10 DIAGNOSIS — I47.1 ATRIAL TACHYCARDIA (HCC): Primary | ICD-10-CM

## 2019-01-10 PROCEDURE — 99212 OFFICE O/P EST SF 10 MIN: CPT | Performed by: NURSE PRACTITIONER

## 2019-01-22 ENCOUNTER — OFFICE VISIT (OUTPATIENT)
Dept: FAMILY MEDICINE CLINIC | Facility: CLINIC | Age: 59
End: 2019-01-22

## 2019-01-22 VITALS
TEMPERATURE: 98 F | HEART RATE: 74 BPM | BODY MASS INDEX: 28.45 KG/M2 | SYSTOLIC BLOOD PRESSURE: 118 MMHG | HEIGHT: 66 IN | WEIGHT: 177 LBS | RESPIRATION RATE: 16 BRPM | DIASTOLIC BLOOD PRESSURE: 82 MMHG

## 2019-01-22 DIAGNOSIS — F51.04 PSYCHOPHYSIOLOGICAL INSOMNIA: ICD-10-CM

## 2019-01-22 DIAGNOSIS — F33.1 MODERATE EPISODE OF RECURRENT MAJOR DEPRESSIVE DISORDER (HCC): Primary | ICD-10-CM

## 2019-01-22 DIAGNOSIS — F51.5 NIGHTMARE: ICD-10-CM

## 2019-01-22 PROCEDURE — 99214 OFFICE O/P EST MOD 30 MIN: CPT | Performed by: FAMILY MEDICINE

## 2019-01-22 RX ORDER — TEMAZEPAM 15 MG/1
15 CAPSULE ORAL NIGHTLY PRN
Qty: 30 CAPSULE | Refills: 1 | Status: SHIPPED | OUTPATIENT
Start: 2019-01-22 | End: 2020-09-25

## 2019-01-22 RX ORDER — ESCITALOPRAM OXALATE 10 MG/1
10 TABLET ORAL DAILY
Qty: 30 TABLET | Refills: 2 | Status: SHIPPED | OUTPATIENT
Start: 2019-01-22 | End: 2020-04-23

## 2019-01-22 NOTE — PROGRESS NOTES
Subjective   Dede Jerez is a 58 y.o. female.     History of Present Illness     Pt has been dealing with depression for a while  She has been doing counseling but  finally passed away after 3 year valentine with cancer.  He passed away on 11/26/18  She is having bad dreams, nightmares  Every dream has her trying to save her  and get to him to help him with no one helping them  Poor focus, grieving, gets confused, crying all the time  She spent so much time in the hospital with her  that it is almost hard for her to sleep in bed    She has to have some one stay with her  So many business decisions she has to do right now    The following portions of the patient's history were reviewed and updated as appropriate: allergies, current medications, past family history, past medical history, past social history, past surgical history and problem list.    Review of Systems   Constitutional: Negative.    HENT: Negative.    Eyes: Negative.    Respiratory: Negative.  Negative for shortness of breath.    Cardiovascular: Negative.  Negative for chest pain.   Gastrointestinal: Negative.    Musculoskeletal: Negative.    Skin: Negative.    Neurological: Negative.    Psychiatric/Behavioral: Positive for dysphoric mood and sleep disturbance.   All other systems reviewed and are negative.      Objective   Physical Exam   Constitutional: She appears well-developed and well-nourished. No distress.   Cardiovascular: Normal rate, regular rhythm and normal heart sounds.   Pulmonary/Chest: Effort normal and breath sounds normal.   Psychiatric: She has a normal mood and affect. Her behavior is normal.   Nursing note and vitals reviewed.      Assessment/Plan   Dede was seen today for insomnia and stress.    Diagnoses and all orders for this visit:    Moderate episode of recurrent major depressive disorder (CMS/HCC)  -     escitalopram (LEXAPRO) 10 MG tablet; Take 1 tablet by mouth Daily.    Psychophysiological  insomnia  -     temazepam (RESTORIL) 15 MG capsule; Take 1 capsule by mouth At Night As Needed for Sleep.    Nightmare    mood is just very poor since her  passed.  She will continue counseling and we will add lexapro to regimen, plan to recheck in 3-4 weeks  Ok restoril for sleep, this likely will not be needed long term but would be helpful at this time.  She will call with any issues with the medicine  F/u in one month

## 2019-02-19 ENCOUNTER — OFFICE VISIT (OUTPATIENT)
Dept: FAMILY MEDICINE CLINIC | Facility: CLINIC | Age: 59
End: 2019-02-19

## 2019-02-19 VITALS
TEMPERATURE: 97.5 F | WEIGHT: 181 LBS | RESPIRATION RATE: 16 BRPM | HEART RATE: 74 BPM | BODY MASS INDEX: 29.09 KG/M2 | DIASTOLIC BLOOD PRESSURE: 80 MMHG | HEIGHT: 66 IN | SYSTOLIC BLOOD PRESSURE: 118 MMHG

## 2019-02-19 DIAGNOSIS — F41.1 GAD (GENERALIZED ANXIETY DISORDER): Primary | ICD-10-CM

## 2019-02-19 PROCEDURE — 99213 OFFICE O/P EST LOW 20 MIN: CPT | Performed by: FAMILY MEDICINE

## 2019-02-19 NOTE — PROGRESS NOTES
Subjective   Dede Jerez is a 58 y.o. female.     History of Present Illness     She heard voices while on the lexapro so she stopped this  [place] the voices after being on lexapro for 5 days  She had done great when on lexapro in the past  She is seeing a counselor still and this is helping.  Seeing them on more than weekly basis    Sleep was doing well with the restoril in regards to her sleep  Uses it PRN    Had failed wellbutrin and paxil in the past      Review of Systems   Constitutional: Negative.        Objective   Physical Exam   Constitutional: She appears well-developed and well-nourished. No distress.   Cardiovascular: Normal rate, regular rhythm and normal heart sounds.   Pulmonary/Chest: Effort normal and breath sounds normal.   Psychiatric: She has a normal mood and affect. Her behavior is normal. Judgment and thought content normal.   Nursing note and vitals reviewed.      Assessment/Plan   Dede was seen today for follow-up.    Diagnoses and all orders for this visit:    MICHAEL (generalized anxiety disorder)      Lets try 5 mg lexapro daily and she will call back in 1-2 weeks with an update.  Agree with her continuing counseling.  Consider trintellix 5 in the future.

## 2019-03-04 ENCOUNTER — TELEPHONE (OUTPATIENT)
Dept: FAMILY MEDICINE CLINIC | Facility: CLINIC | Age: 59
End: 2019-03-04

## 2019-03-04 NOTE — TELEPHONE ENCOUNTER
----- Message from Ana Maria Dukes sent at 3/4/2019  3:08 PM EST -----  Contact: nereida;pt called  PT CALLED STATING THE LEXPRO IS HELP WITH THE DOSAGE SHE IS ON-SHE  HAS NO BAD SIDE EFFECTS    [phone]

## 2019-05-29 ENCOUNTER — OFFICE VISIT (OUTPATIENT)
Dept: FAMILY MEDICINE CLINIC | Facility: CLINIC | Age: 59
End: 2019-05-29

## 2019-05-29 ENCOUNTER — HOSPITAL ENCOUNTER (OUTPATIENT)
Dept: GENERAL RADIOLOGY | Facility: HOSPITAL | Age: 59
Discharge: HOME | End: 2019-05-29
Admitting: FAMILY MEDICINE

## 2019-05-29 VITALS
BODY MASS INDEX: 29.25 KG/M2 | HEIGHT: 66 IN | HEART RATE: 74 BPM | TEMPERATURE: 97.4 F | WEIGHT: 182 LBS | SYSTOLIC BLOOD PRESSURE: 118 MMHG | DIASTOLIC BLOOD PRESSURE: 82 MMHG | RESPIRATION RATE: 16 BRPM

## 2019-05-29 DIAGNOSIS — M25.561 ACUTE PAIN OF RIGHT KNEE: ICD-10-CM

## 2019-05-29 DIAGNOSIS — M23.306 MENISCUS DEGENERATION, RIGHT: ICD-10-CM

## 2019-05-29 DIAGNOSIS — M25.561 ACUTE PAIN OF RIGHT KNEE: Primary | ICD-10-CM

## 2019-05-29 PROCEDURE — 99214 OFFICE O/P EST MOD 30 MIN: CPT | Performed by: FAMILY MEDICINE

## 2019-05-29 PROCEDURE — 73560 X-RAY EXAM OF KNEE 1 OR 2: CPT

## 2019-05-29 RX ORDER — NAPROXEN 500 MG/1
500 TABLET ORAL 2 TIMES DAILY WITH MEALS
Qty: 60 TABLET | Refills: 1 | Status: SHIPPED | OUTPATIENT
Start: 2019-05-29 | End: 2019-10-22 | Stop reason: ALTCHOICE

## 2019-05-29 NOTE — PROGRESS NOTES
Subjective   Dede Jerez is a 58 y.o. female.     History of Present Illness     For the last 1-2 months she has had worse right knee pain  She was putting a car artemio in her car and was bending awkwardly and she felt a pop  She felt immediate pain and has had intermittent pain in the right knee since that time, can be sharp, aching, dull  Aggravating: walking  Alleviating: rest, hot bath  Can be worse on inclines or stairs    The following portions of the patient's history were reviewed and updated as appropriate: allergies, current medications, past family history, past medical history, past social history, past surgical history and problem list.    Review of Systems   Constitutional: Negative.    HENT: Negative.    Eyes: Negative.    Respiratory: Negative.    Cardiovascular: Negative.    Gastrointestinal: Negative.    Musculoskeletal:        Hpi   Skin: Negative.  Negative for rash.   Neurological: Negative.    Psychiatric/Behavioral: Negative.    All other systems reviewed and are negative.      Objective   Physical Exam   Constitutional: She appears well-developed and well-nourished. No distress.   Cardiovascular: Normal rate, regular rhythm and normal heart sounds.   Pulmonary/Chest: Effort normal and breath sounds normal.   Musculoskeletal:        Legs:  Psychiatric: She has a normal mood and affect. Her behavior is normal.   Nursing note and vitals reviewed.      Assessment/Plan   Dede was seen today for knee pain.    Diagnoses and all orders for this visit:    Acute pain of right knee  -     XR Knee 1 or 2 View Right; Future  -     naproxen (NAPROSYN) 500 MG tablet; Take 1 tablet by mouth 2 (Two) Times a Day With Meals.    Meniscus degeneration, right  -     XR Knee 1 or 2 View Right; Future  -     naproxen (NAPROSYN) 500 MG tablet; Take 1 tablet by mouth 2 (Two) Times a Day With Meals.    I am concerned about meniscal injury.  Will check XR, treat with naproxen and neoprene sleeve. Pt to call back INB  in 2 weeks if XR normal and would order MRI at that time for evaluation

## 2019-06-19 ENCOUNTER — TELEPHONE (OUTPATIENT)
Dept: FAMILY MEDICINE CLINIC | Facility: CLINIC | Age: 59
End: 2019-06-19

## 2019-06-19 DIAGNOSIS — M23.306 MENISCUS DEGENERATION, RIGHT: Primary | ICD-10-CM

## 2019-06-19 NOTE — TELEPHONE ENCOUNTER
----- Message from German Peralta sent at 6/19/2019  1:58 PM EDT -----  Contact: PT; CHRISTIANO  PATIENT IS WANTING TO KNOW CAN DR ALVARADO ORDER A MRI OF HER RT KNEE ITS NOT ANY BETTER.  SHE WANTS TO HAVE IT AT [facility].  SHE ALSO WANTS THEM TO CALL HER WITH THE PRICE BEFORE SHE HAS IT DONE AS WELL.  IF IT COST TOO MUCH SHE WONT HAVE IT DONE.    PT: [phone]

## 2019-07-01 ENCOUNTER — APPOINTMENT (OUTPATIENT)
Dept: MRI IMAGING | Facility: HOSPITAL | Age: 59
End: 2019-07-01

## 2019-08-26 ENCOUNTER — OFFICE VISIT (OUTPATIENT)
Dept: FAMILY MEDICINE CLINIC | Facility: CLINIC | Age: 59
End: 2019-08-26

## 2019-08-26 VITALS
BODY MASS INDEX: 29.09 KG/M2 | RESPIRATION RATE: 16 BRPM | HEIGHT: 66 IN | TEMPERATURE: 98.4 F | SYSTOLIC BLOOD PRESSURE: 128 MMHG | HEART RATE: 72 BPM | DIASTOLIC BLOOD PRESSURE: 82 MMHG | WEIGHT: 181 LBS

## 2019-08-26 DIAGNOSIS — M79.604 RIGHT LEG PAIN: Primary | ICD-10-CM

## 2019-08-26 PROCEDURE — 99213 OFFICE O/P EST LOW 20 MIN: CPT | Performed by: FAMILY MEDICINE

## 2019-08-26 NOTE — PROGRESS NOTES
Subjective   Dede Jerez is a 58 y.o. female.     History of Present Illness     She is concerned about her right leg pain still and is worried about the blood flow to the leg  She just feels like the leg is dragging  The leg simply hurts, can swell up around the knee    The XR showed degenerative changes but she did not get the MRI due to cost      Review of Systems   Constitutional: Negative.    Musculoskeletal:        Right leg pain       Objective   Physical Exam   Constitutional: She appears well-developed and well-nourished. No distress.   Cardiovascular: Normal rate, regular rhythm and normal heart sounds.   Pulmonary/Chest: Effort normal and breath sounds normal.   Musculoskeletal:   Right leg hurts around the knee   Psychiatric: She has a normal mood and affect. Her behavior is normal. Judgment and thought content normal.   Nursing note and vitals reviewed.      Assessment/Plan   Dede was seen today for knee pain.    Diagnoses and all orders for this visit:    Right leg pain    I think this is more arthritis or degenerative changes in the knee than anything else, will check NO to ensure no blood flow issue and plan ortho evaluation if the NO is normal  She did not get MRI we had ordered in June due to cost    Pt requests [facility] if this is needed

## 2019-09-10 ENCOUNTER — TELEPHONE (OUTPATIENT)
Dept: FAMILY MEDICINE CLINIC | Facility: CLINIC | Age: 59
End: 2019-09-10

## 2019-09-10 NOTE — TELEPHONE ENCOUNTER
----- Message from Ana Maria Dukes sent at 9/10/2019  9:13 AM EDT -----  Contact: [facility] CALLED  RESULTS OF DOPPLER STUDY  [phone]

## 2019-10-08 DIAGNOSIS — M23.306 MENISCUS DEGENERATION, RIGHT: Primary | ICD-10-CM

## 2019-10-22 ENCOUNTER — OFFICE VISIT (OUTPATIENT)
Dept: ORTHOPEDIC SURGERY | Facility: CLINIC | Age: 59
End: 2019-10-22

## 2019-10-22 VITALS — BODY MASS INDEX: 29.09 KG/M2 | HEIGHT: 66 IN | WEIGHT: 181 LBS | OXYGEN SATURATION: 98 % | HEART RATE: 55 BPM

## 2019-10-22 DIAGNOSIS — M17.11 PRIMARY OSTEOARTHRITIS OF RIGHT KNEE: Primary | ICD-10-CM

## 2019-10-22 PROCEDURE — 99244 OFF/OP CNSLTJ NEW/EST MOD 40: CPT | Performed by: ORTHOPAEDIC SURGERY

## 2019-10-22 RX ORDER — MELOXICAM 15 MG/1
TABLET ORAL
Qty: 60 TABLET | Refills: 0 | Status: SHIPPED | OUTPATIENT
Start: 2019-10-22 | End: 2020-04-23

## 2019-10-22 NOTE — PROGRESS NOTES
Orthopaedic Clinic Note: Knee Established Patient    Chief Complaint   Patient presents with   • Right Knee - Pain             HPI    It has been {Numbers; 0-30:29650}  {DAYS, WEEKS, MONTHS, YEARS:80197} since Ms. Jerez's last visit. She returns to clinic today for ***. She rates her pain a {0-10:15981}/10 on the pain scale and is currently taking {Meds Pt is Takin} for pain. She is ambulating with {Ambulating Devices:10049}. She {completed/continuin} {therapy/home exercise program:73029}.  She {denies/admits drainage:06228}.  Overall, she is doing {better worse same:23323}. ***    Past Medical History:   Diagnosis Date   • Acid reflux    • Anxiety     panic attack related throat constricting making swallowing hard at times    • Arrhythmia    • Cataract     bilat - mild    • Constipation     in past with dieting but fine now    • High cholesterol    • Measles    • Menopause    • Stroke (CMS/HCC)    • TIA (transient ischemic attack)    • Wears glasses    • Wears partial dentures     bottom      Past Surgical History:   Procedure Laterality Date   • CARDIAC ELECTROPHYSIOLOGY PROCEDURE N/A 2017    Procedure: Ablation SVT, Hold Metoprolol for 5 days prior;  Surgeon: Manny Grimes MD;  Location: [facility] INVASIVE LOCATION;  Service:    • CARPAL TUNNEL RELEASE Bilateral    • COLONOSCOPY     • TUBAL ABDOMINAL LIGATION        Family History   Problem Relation Age of Onset   • Heart attack Mother    • Coronary artery disease Mother    • Diabetes Mother    • Hypertension Mother    • Hypertension Father    • Hypertension Sister    • Diabetes Sister    • Hypertension Brother    • Diabetes Brother    • Hypertension Maternal Grandmother    • Hypertension Maternal Grandfather    • No Known Problems Paternal Grandmother    • No Known Problems Paternal Grandfather      Social History     Socioeconomic History   • Marital status:      Spouse name: Not on file   • Number of children: 2   • Years  "of education: Not on file   • Highest education level: Not on file   Tobacco Use   • Smoking status: Former Smoker     Packs/day: 1.00     Years: 25.00     Pack years: 25.00     Types: Cigarettes     Last attempt to quit: 2018     Years since quittin.8   • Smokeless tobacco: Never Used   • Tobacco comment: has not smoked in 3 months   Substance and Sexual Activity   • Alcohol use: No   • Drug use: No   • Sexual activity: Defer   Social History Narrative    Patient consumes 2-3 cups coffee daily.     Patient lives at home with .       Current Outpatient Medications on File Prior to Visit   Medication Sig Dispense Refill   • aspirin 81 MG tablet Take 81 mg by mouth As Needed. 30 tablet 11   • escitalopram (LEXAPRO) 10 MG tablet Take 1 tablet by mouth Daily. 30 tablet 2   • naproxen (NAPROSYN) 500 MG tablet Take 1 tablet by mouth 2 (Two) Times a Day With Meals. 60 tablet 1   • raNITIdine (ZANTAC) 150 MG tablet Take 1 tablet by mouth 2 (Two) Times a Day. (Patient taking differently: Take 150 mg by mouth As Needed.) 60 tablet 5   • temazepam (RESTORIL) 15 MG capsule Take 1 capsule by mouth At Night As Needed for Sleep. 30 capsule 1     No current facility-administered medications on file prior to visit.       Allergies   Allergen Reactions   • Biaxin [Clarithromycin] Confusion     TABS        Review of Systems   Constitutional: Negative.    HENT: Negative.    Eyes: Negative.    Respiratory: Negative.    Cardiovascular: Positive for leg swelling.   Gastrointestinal: Negative.    Endocrine: Negative.    Genitourinary: Negative.    Musculoskeletal: Positive for arthralgias.   Skin: Negative.    Allergic/Immunologic: Negative.    Neurological: Negative.    Hematological: Negative.    Psychiatric/Behavioral: Negative.         The patient's Review of Systems was personally reviewed and confirmed as accurate.    Physical Exam  Pulse 55, height 167.6 cm (65.98\"), weight 82.1 kg (180 lb 16 oz), SpO2 98 %, not currently " "breastfeeding.    Body mass index is 29.23 kg/m².    GENERAL APPEARANCE: {General Appearance:92751::\"awake, alert, oriented, in no acute distress\",\"well developed, well nourished\"}  LUNGS:  breathing nonlabored  EXTREMITIES: no clubbing, cyanosis  PERIPHERAL PULSES: palpable dorsalis pedis and posterior tibial pulses bilaterally.    GAIT:  {Gait:38436}        ----------  {RIGHT LEFT BILATERAL:03110} Knee Exam:  ----------  ALIGNMENT: {Neutral/varus:16374}  ----------  RANGE OF MOTION:  {Dominik Knee ROM:01056}  LIGAMENTOUS STABILITY:   {Dominik Stable:65703}  ----------  STRENGTH:  KNEE FLEXION {0-5:79094::\"5\"}/5  KNEE EXTENSION  {0-5:40644::\"5\"}/5  ANKLE DORSIFLEXION  {0-5:24894::\"5\"}/5  ANKLE PLANTARFLEXION  {0-5:19140::\"5\"}/5  ----------  PAIN WITH PALPATION:{Knee pain location:79034}  KNEE EFFUSION: {Yes/No:54738::\"no\"}  PAIN WITH KNEE ROM: {YES NO:04850}  PATELLAR CREPITUS:  {knee crepitus:25374::\"no\"}  ----------  SENSATION TO LIGHT TOUCH:  DEEP PERONEAL/SUPERFICIAL PERONEAL/SURAL/SAPHENOUS/TIBIAL:    {Sensation:06723::\"intact\"}  ----------  EDEMA:  {Yes/No:34223::\"no\"}  ERYTHEMA:    {Yes/No:82030::\"no\"}  WOUNDS/INCISIONS:   {Yes/No:43145::\"no\"}  _____________________________________________________________________  _____________________________________________________________________    RADIOGRAPHIC FINDINGS:   Indication: ***    Comparison: {Dominik XR comparison:33333}    Knee films: {Dominik knee XR findings:50539}    Assessment/Plan:  No diagnosis found.  ***      Vibha Batres MA  10/22/19  10:39 AM  "

## 2019-10-22 NOTE — PROGRESS NOTES
Orthopaedic Clinic Note: Knee New Patient    Chief Complaint   Patient presents with   • Right Knee - Pain     Meniscus Degeneration        HPI  Consult from: Quinn Gonzales MD    Dede Jerez is a 59 y.o. female who presents with right knee pain for 8 month(s). Onset has been atraumatic and gradual nature.  Patient localizes her pain primarily to the anterior aspect of her knee.  She is complaining of pain, swelling, grinding, and stiffness of the knee that is worse with sitting and sleeping.  She rates her pain 5/10 on pain scale.  She is having constant aching throbbing and occasional shooting sensation in the knee.  Previous treatments have included bracing.  No other interventions have been tried to date apart from occasional anti-inflammatories.  She does have prescription naproxen but she is not taking it due to some gastric irritation.  She is here to discuss treatment for ongoing knee pain which is limiting daily activities.    Past Medical History:   Diagnosis Date   • Acid reflux    • Anxiety     panic attack related throat constricting making swallowing hard at times    • Arrhythmia    • Cataract     bilat - mild    • Constipation     in past with dieting but fine now    • High cholesterol    • Measles    • Menopause    • Stroke (CMS/HCC)    • TIA (transient ischemic attack) 2010   • Wears glasses    • Wears partial dentures     bottom      Past Surgical History:   Procedure Laterality Date   • CARDIAC ELECTROPHYSIOLOGY PROCEDURE N/A 11/9/2017    Procedure: Ablation SVT, Hold Metoprolol for 5 days prior;  Surgeon: Manny Grimes MD;  Location: [facility] INVASIVE LOCATION;  Service:    • CARPAL TUNNEL RELEASE Bilateral    • COLONOSCOPY     • TUBAL ABDOMINAL LIGATION  1980      Family History   Problem Relation Age of Onset   • Heart attack Mother    • Coronary artery disease Mother    • Diabetes Mother    • Hypertension Mother    • Hypertension Father    • Hypertension Sister    • Diabetes Sister     • Hypertension Brother    • Diabetes Brother    • Hypertension Maternal Grandmother    • Hypertension Maternal Grandfather    • No Known Problems Paternal Grandmother    • No Known Problems Paternal Grandfather      Social History     Socioeconomic History   • Marital status:      Spouse name: Not on file   • Number of children: 2   • Years of education: Not on file   • Highest education level: Not on file   Tobacco Use   • Smoking status: Former Smoker     Packs/day: 1.00     Years: 25.00     Pack years: 25.00     Types: Cigarettes     Last attempt to quit: 2018     Years since quittin.8   • Smokeless tobacco: Never Used   • Tobacco comment: has not smoked in 3 months   Substance and Sexual Activity   • Alcohol use: No   • Drug use: No   • Sexual activity: Defer   Social History Narrative    Patient consumes 2-3 cups coffee daily.     Patient lives at home with .       Current Outpatient Medications on File Prior to Visit   Medication Sig Dispense Refill   • aspirin 81 MG tablet Take 81 mg by mouth As Needed. 30 tablet 11   • escitalopram (LEXAPRO) 10 MG tablet Take 1 tablet by mouth Daily. 30 tablet 2   • raNITIdine (ZANTAC) 150 MG tablet Take 1 tablet by mouth 2 (Two) Times a Day. (Patient taking differently: Take 150 mg by mouth As Needed.) 60 tablet 5   • temazepam (RESTORIL) 15 MG capsule Take 1 capsule by mouth At Night As Needed for Sleep. 30 capsule 1   • [DISCONTINUED] naproxen (NAPROSYN) 500 MG tablet Take 1 tablet by mouth 2 (Two) Times a Day With Meals. 60 tablet 1     No current facility-administered medications on file prior to visit.       Allergies   Allergen Reactions   • Biaxin [Clarithromycin] Confusion     TABS        Review of Systems   Constitutional: Negative.    HENT: Negative.    Eyes: Negative.    Respiratory: Negative.    Cardiovascular: Positive for leg swelling.   Gastrointestinal: Negative.    Endocrine: Negative.    Genitourinary: Negative.    Musculoskeletal:  "Positive for arthralgias.   Skin: Negative.    Allergic/Immunologic: Negative.    Neurological: Negative.    Hematological: Negative.    Psychiatric/Behavioral: Negative.      The patient's Review of Systems was personally reviewed and confirmed as accurate.    The following portions of the patient's history were reviewed and updated as appropriate: allergies, current medications, past family history, past medical history, past social history, past surgical history and problem list.    Physical Exam  Pulse 55, height 167.6 cm (65.98\"), weight 82.1 kg (180 lb 16 oz), SpO2 98 %, not currently breastfeeding.    Body mass index is 29.23 kg/m².    GENERAL APPEARANCE: awake, alert & oriented x 3, in no acute distress and well developed, well nourished  PSYCH: normal affect  LUNGS:  breathing nonlabored  EYES: sclera anicteric  CARDIOVASCULAR: palpable dorsalis pedis, palpable posterior tibial bilaterally. Capillary refill less than 2 seconds  EXTREMITIES: no clubbing, cyanosis  GAIT:  Antalgic            Right Lower Extremity Exam:   ----------  Hip Exam  ----------  FLEXION CONTRACTURE: None  FLEXION: 110 degrees  INTERNAL ROTATION: 20 degrees at 90 degrees of flexion   EXTERNAL ROTATION: 40 degrees at 90 degrees of flexion    PAIN WITH HIP MOTION: no  ----------  Knee Exam  ----------  ALIGNMENT: neutral    RANGE OF MOTION:  Decreased (3 - 115 degrees) with no extensor lag  LIGAMENTOUS STABILITY:   stable to varus and valgus stress at terminal extension and 30 degrees without any evidence of laxity     STRENGTH:  5/5 knee flexion, extension. 5/5 ankle dorsiflexion and plantarflexion.     PAIN WITH PALPATION: medial joint line, lateral joint line and anterior knee  KNEE EFFUSION: yes, trace effusion  PAIN WITH KNEE ROM: yes, anteriorly  PATELLAR CREPITUS: yes, painful and symptomatic  SPECIAL EXAM FINDINGS:  painful patellar compression    REFLEXES:  PATELLAR 2+/4  ACHILLES 2+/4    CLONUS: no  STRAIGHT LEG TEST:   " negative    SENSATION TO LIGHT TOUCH:  DEEP PERONEAL/SUPERFICIAL PERONEAL/SURAL/SAPHENOUS/TIBIAL:   intact    EDEMA:  no  ERYTHEMA:  no  WOUNDS/INCISIONS:  no        Left Lower Extremity Exam:   ----------  Hip Exam  ----------  FLEXION CONTRACTURE: None  FLEXION: 110 degrees  INTERNAL ROTATION: 20 degrees at 90 degrees of flexion   EXTERNAL ROTATION: 40 degrees at 90 degrees of flexion    PAIN WITH HIP MOTION: no  ----------  Knee Exam  ----------  ALIGNMENT: neutral, no varus or valgus deformity     RANGE OF MOTION:  Normal (0-120 degrees) with no extensor lag or flexion contracture  LIGAMENTOUS STABILITY:   stable to varus and valgus stress at 0 and 30 degrees without any evidence of laxity     STRENGTH:  5/5 knee flexion, extension. 5/5 ankle dorsiflexion and plantarflexion.     PAIN WITH PALPATION: denies tenderness to palpation about the knee, denies medial or lateral joint line pain  KNEE EFFUSION: no  PAIN WITH KNEE ROM: no  PATELLAR CREPITUS: yes, asymptomatic  SPECIAL EXAM FINDINGS:  Negative patellar compression    REFLEXES:  PATELLAR 2+/4  ACHILLES 2+/4    CLONUS: negative  STRAIGHT LEG TEST:   negative    SENSATION TO LIGHT TOUCH:  DEEP PERONEAL/SUPERFICIAL PERONEAL/SURAL/SAPHENOUS/TIBIAL:   intact    EDEMA:  no  ERYTHEMA:  no  WOUNDS/INCISIONS: no overlying skin problems.    ______________________________________________________________________  ______________________________________________________________________    RADIOGRAPHIC FINDINGS:   Right knee radiographs and MRI from 5/29/2019 and 6/19/2019 were personally reviewed.  Right knee radiographs demonstrate moderate tricompartmental osteoarthritic changes.  MRI demonstrates tricompartmental arthritic changes with a degenerative tear of the medial meniscus with medial meniscal extrusion.    Assessment/Plan:   Diagnosis Plan   1. Primary osteoarthritis of right knee  meloxicam (MOBIC) 15 MG tablet     Patient has right knee osteoarthritis with  degenerative medial meniscal tear.  I explained this is best treated conservatively without surgical intervention.  She is agreeable to this.  Will prescribe prescription anti-inflammatory which she is to take over the next couple of weeks.  I will see her back in 3 weeks for repeat evaluation.  If her symptoms fail to improve, more aggressive intervention may be warranted.    Donny Lehman MD  10/22/19  12:44 PM

## 2019-11-15 ENCOUNTER — OFFICE VISIT (OUTPATIENT)
Dept: ORTHOPEDIC SURGERY | Facility: CLINIC | Age: 59
End: 2019-11-15

## 2019-11-15 VITALS — HEIGHT: 66 IN | OXYGEN SATURATION: 98 % | HEART RATE: 75 BPM | BODY MASS INDEX: 29.09 KG/M2 | WEIGHT: 181 LBS

## 2019-11-15 DIAGNOSIS — M70.50 PES ANSERINE BURSITIS: Primary | ICD-10-CM

## 2019-11-15 DIAGNOSIS — M17.11 PRIMARY OSTEOARTHRITIS OF RIGHT KNEE: ICD-10-CM

## 2019-11-15 PROCEDURE — 99213 OFFICE O/P EST LOW 20 MIN: CPT | Performed by: ORTHOPAEDIC SURGERY

## 2019-11-15 PROCEDURE — 20610 DRAIN/INJ JOINT/BURSA W/O US: CPT | Performed by: ORTHOPAEDIC SURGERY

## 2019-11-15 RX ORDER — LIDOCAINE HYDROCHLORIDE 10 MG/ML
2 INJECTION, SOLUTION EPIDURAL; INFILTRATION; INTRACAUDAL; PERINEURAL
Status: COMPLETED | OUTPATIENT
Start: 2019-11-15 | End: 2019-11-15

## 2019-11-15 RX ORDER — TRIAMCINOLONE ACETONIDE 40 MG/ML
40 INJECTION, SUSPENSION INTRA-ARTICULAR; INTRAMUSCULAR
Status: COMPLETED | OUTPATIENT
Start: 2019-11-15 | End: 2019-11-15

## 2019-11-15 RX ADMIN — TRIAMCINOLONE ACETONIDE 40 MG: 40 INJECTION, SUSPENSION INTRA-ARTICULAR; INTRAMUSCULAR at 09:08

## 2019-11-15 RX ADMIN — LIDOCAINE HYDROCHLORIDE 2 ML: 10 INJECTION, SOLUTION EPIDURAL; INFILTRATION; INTRACAUDAL; PERINEURAL at 09:08

## 2019-11-15 NOTE — PROGRESS NOTES
Procedure   Large Joint Arthrocentesis-right pes bursa: R knee  Date/Time: 11/15/2019 9:08 AM  Consent given by: patient  Site marked: site marked  Timeout: Immediately prior to procedure a time out was called to verify the correct patient, procedure, equipment, support staff and site/side marked as required   Supporting Documentation  Indications: pain   Procedure Details  Location: knee - R knee  Preparation: Patient was prepped and draped in the usual sterile fashion  Needle size: 22 G  Approach: anteromedial  Medications administered: 40 mg triamcinolone acetonide 40 MG/ML; 2 mL lidocaine PF 1% 1 % (Bupivacaine 0.25% 2.5mg/mL 2 cc NDC: 55150-167-10 LOT: [id number] EXP: [id number])  Patient tolerance: patient tolerated the procedure well with no immediate complications

## 2019-11-15 NOTE — PROGRESS NOTES
Orthopaedic Clinic Note: Knee Established Patient    Chief Complaint   Patient presents with   • Follow-up     3 week follow up - Primary osteoarthritis of right knee        HPI    It has been 3  week(s) since Ms. Jerez's last visit. She returns to clinic today for follow-up right knee osteoarthritis.  She underwent prescription anti-inflammatory treatment at her last visit.  She returns to clinic today stating that her knee stiffness and swelling has improved but she still having pain along the medial proximal tibia over the pes bursa.  She rates the pain a 1/10 on the pain scale but states that with weightbearing and walking the pain significantly increases.  She is having pain that she describes a throbbing sensation at night.  Overall she is doing slightly better but is still having limitations in daily activities.  She is ambulate with no assistive device.    Past Medical History:   Diagnosis Date   • Acid reflux    • Anxiety     panic attack related throat constricting making swallowing hard at times    • Arrhythmia    • Cataract     bilat - mild    • Constipation     in past with dieting but fine now    • High cholesterol    • Measles    • Menopause    • Stroke (CMS/HCC)    • TIA (transient ischemic attack) 2010   • Wears glasses    • Wears partial dentures     bottom      Past Surgical History:   Procedure Laterality Date   • CARDIAC ELECTROPHYSIOLOGY PROCEDURE N/A 11/9/2017    Procedure: Ablation SVT, Hold Metoprolol for 5 days prior;  Surgeon: Manny Grimes MD;  Location: [facility] INVASIVE LOCATION;  Service:    • CARPAL TUNNEL RELEASE Bilateral    • COLONOSCOPY     • TUBAL ABDOMINAL LIGATION  1980      Family History   Problem Relation Age of Onset   • Heart attack Mother    • Coronary artery disease Mother    • Diabetes Mother    • Hypertension Mother    • Hypertension Father    • Hypertension Sister    • Diabetes Sister    • Hypertension Brother    • Diabetes Brother    • Hypertension Maternal  Grandmother    • Hypertension Maternal Grandfather    • No Known Problems Paternal Grandmother    • No Known Problems Paternal Grandfather      Social History     Socioeconomic History   • Marital status:      Spouse name: Not on file   • Number of children: 2   • Years of education: Not on file   • Highest education level: Not on file   Tobacco Use   • Smoking status: Former Smoker     Packs/day: 1.00     Years: 25.00     Pack years: 25.00     Types: Cigarettes     Last attempt to quit: 2018     Years since quittin.8   • Smokeless tobacco: Never Used   • Tobacco comment: has not smoked in 3 months   Substance and Sexual Activity   • Alcohol use: No   • Drug use: No   • Sexual activity: Defer   Social History Narrative    Patient consumes 2-3 cups coffee daily.     Patient lives at home with .       Current Outpatient Medications on File Prior to Visit   Medication Sig Dispense Refill   • aspirin 81 MG tablet Take 81 mg by mouth As Needed. 30 tablet 11   • escitalopram (LEXAPRO) 10 MG tablet Take 1 tablet by mouth Daily. 30 tablet 2   • meloxicam (MOBIC) 15 MG tablet 1 PO Daily with food. 60 tablet 0   • raNITIdine (ZANTAC) 150 MG tablet Take 1 tablet by mouth 2 (Two) Times a Day. (Patient taking differently: Take 150 mg by mouth As Needed.) 60 tablet 5   • temazepam (RESTORIL) 15 MG capsule Take 1 capsule by mouth At Night As Needed for Sleep. 30 capsule 1     No current facility-administered medications on file prior to visit.       Allergies   Allergen Reactions   • Biaxin [Clarithromycin] Confusion     TABS        Review of Systems   Constitutional: Negative.    HENT: Negative.    Eyes: Negative.    Respiratory: Negative.    Cardiovascular: Positive for leg swelling.   Gastrointestinal: Negative.    Endocrine: Negative.    Genitourinary: Negative.    Musculoskeletal: Positive for joint swelling.   Skin: Negative.    Allergic/Immunologic: Negative.    Neurological: Negative.    Hematological:  "Negative.    Psychiatric/Behavioral: Positive for sleep disturbance.        The patient's Review of Systems was personally reviewed and confirmed as accurate.    Physical Exam  Pulse 75, height 167.6 cm (65.98\"), weight 82.1 kg (180 lb 16 oz), SpO2 98 %, not currently breastfeeding.    Body mass index is 29.23 kg/m².    GENERAL APPEARANCE: awake, alert, oriented, in no acute distress and well developed, well nourished  LUNGS:  breathing nonlabored  EXTREMITIES: no clubbing, cyanosis  PERIPHERAL PULSES: palpable dorsalis pedis and posterior tibial pulses bilaterally.    GAIT:  Antalgic        ----------  Right Knee Exam:  ----------  ALIGNMENT: neutral  ----------  RANGE OF MOTION:  Normal (0-120 degrees) with no extensor lag or flexion contracture  LIGAMENTOUS STABILITY:   stable to varus and valgus stress at terminal extension and 30 degrees without any evidence of laxity  ----------  STRENGTH:  KNEE FLEXION 5/5  KNEE EXTENSION  5/5  ANKLE DORSIFLEXION  5/5  ANKLE PLANTARFLEXION  5/5  ----------  PAIN WITH PALPATION:pes bursa  KNEE EFFUSION: no  PAIN WITH KNEE ROM: yes  PATELLAR CREPITUS:  no  ----------  SENSATION TO LIGHT TOUCH:  DEEP PERONEAL/SUPERFICIAL PERONEAL/SURAL/SAPHENOUS/TIBIAL:    intact  ----------  EDEMA:  no  ERYTHEMA:    no  WOUNDS/INCISIONS:   no  _____________________________________________________________________  _____________________________________________________________________    RADIOGRAPHIC FINDINGS:   No new imaging today    Assessment/Plan:   Diagnosis Plan   1. Pes anserine bursitis  Large Joint Arthrocentesis-right pes bursa: R knee   2. Primary osteoarthritis of right knee       Patient's residual pain is due to pes bursitis.  I recommended a pes bursa injection today.  She is agreeable to this.  She will follow-up as needed.    Procedure Note:  I discussed with the patient the potential benefits of performing a therapeutic injection of the right knee pes bursa as well as potential " risks including but not limited to infection, swelling, pain, bleeding, bruising, nerve/vessel damage, skin color changes, transient elevation in blood glucose levels, and fat atrophy. After informed consent and after the area was prepped with alcohol, ethyl chloride was used to numb the skin. Via the anteromedial approach, 2 cc of 1% lidocaine, 2 cc of 0.25% bupivacaine and 1 cc of 40mg/ml of Kenalog were injected into the right knee pes bursa. The patient tolerated the procedure well. There were no complications. A sterile dressing was placed over the injection site.      Donny Lehman MD  11/15/19  9:18 AM

## 2019-12-31 ENCOUNTER — OFFICE VISIT (OUTPATIENT)
Dept: ORTHOPEDIC SURGERY | Facility: CLINIC | Age: 59
End: 2019-12-31

## 2019-12-31 VITALS — WEIGHT: 188 LBS | OXYGEN SATURATION: 98 % | HEIGHT: 66 IN | HEART RATE: 70 BPM | BODY MASS INDEX: 30.22 KG/M2

## 2019-12-31 DIAGNOSIS — M25.561 RIGHT KNEE PAIN, UNSPECIFIED CHRONICITY: ICD-10-CM

## 2019-12-31 DIAGNOSIS — M17.11 PRIMARY OSTEOARTHRITIS OF RIGHT KNEE: Primary | ICD-10-CM

## 2019-12-31 PROCEDURE — 20610 DRAIN/INJ JOINT/BURSA W/O US: CPT | Performed by: ORTHOPAEDIC SURGERY

## 2019-12-31 PROCEDURE — 99213 OFFICE O/P EST LOW 20 MIN: CPT | Performed by: ORTHOPAEDIC SURGERY

## 2019-12-31 RX ORDER — TRIAMCINOLONE ACETONIDE 40 MG/ML
80 INJECTION, SUSPENSION INTRA-ARTICULAR; INTRAMUSCULAR
Status: COMPLETED | OUTPATIENT
Start: 2019-12-31 | End: 2019-12-31

## 2019-12-31 RX ORDER — BUPIVACAINE HYDROCHLORIDE 2.5 MG/ML
3 INJECTION, SOLUTION EPIDURAL; INFILTRATION; INTRACAUDAL
Status: COMPLETED | OUTPATIENT
Start: 2019-12-31 | End: 2019-12-31

## 2019-12-31 RX ORDER — LIDOCAINE HYDROCHLORIDE 10 MG/ML
3 INJECTION, SOLUTION EPIDURAL; INFILTRATION; INTRACAUDAL; PERINEURAL
Status: COMPLETED | OUTPATIENT
Start: 2019-12-31 | End: 2019-12-31

## 2019-12-31 RX ADMIN — LIDOCAINE HYDROCHLORIDE 3 ML: 10 INJECTION, SOLUTION EPIDURAL; INFILTRATION; INTRACAUDAL; PERINEURAL at 10:39

## 2019-12-31 RX ADMIN — BUPIVACAINE HYDROCHLORIDE 3 ML: 2.5 INJECTION, SOLUTION EPIDURAL; INFILTRATION; INTRACAUDAL at 10:39

## 2019-12-31 RX ADMIN — TRIAMCINOLONE ACETONIDE 80 MG: 40 INJECTION, SUSPENSION INTRA-ARTICULAR; INTRAMUSCULAR at 10:39

## 2019-12-31 NOTE — PROGRESS NOTES
Orthopaedic Clinic Note: Knee Established Patient    Chief Complaint   Patient presents with   • Right Knee - Pain     6 week f/u  Primary osteoarthritis of right knee; pes bursa injection given 11/15/19        HPI    It has been 6  week(s) since Ms. Jerez's last visit. She returns to clinic today for follow-up right knee pain.  She had previously undergone treatment with oral anti-inflammatory as well as a pes bursa injection.  Both treatments have been refractory to her alleviating her symptoms.  She is still having pain localized primarily to the anterior aspect of the knee with some intermittent swelling and stiffness.  Rates pain 5/10 on the pain scale.  Pain is worse with weightbearing and walking and increases in severity throughout the day.  Overall she is doing about the same.    Past Medical History:   Diagnosis Date   • Acid reflux    • Anxiety     panic attack related throat constricting making swallowing hard at times    • Arrhythmia    • Cataract     bilat - mild    • Constipation     in past with dieting but fine now    • High cholesterol    • Measles    • Menopause    • Stroke (CMS/HCC)    • TIA (transient ischemic attack) 2010   • Wears glasses    • Wears partial dentures     bottom      Past Surgical History:   Procedure Laterality Date   • CARDIAC ELECTROPHYSIOLOGY PROCEDURE N/A 11/9/2017    Procedure: Ablation SVT, Hold Metoprolol for 5 days prior;  Surgeon: Manny Grimes MD;  Location: [facility] INVASIVE LOCATION;  Service:    • CARPAL TUNNEL RELEASE Bilateral    • COLONOSCOPY     • TUBAL ABDOMINAL LIGATION  1980      Family History   Problem Relation Age of Onset   • Heart attack Mother    • Coronary artery disease Mother    • Diabetes Mother    • Hypertension Mother    • Hypertension Father    • Hypertension Sister    • Diabetes Sister    • Hypertension Brother    • Diabetes Brother    • Hypertension Maternal Grandmother    • Hypertension Maternal Grandfather    • No Known Problems  Paternal Grandmother    • No Known Problems Paternal Grandfather      Social History     Socioeconomic History   • Marital status:      Spouse name: Not on file   • Number of children: 2   • Years of education: Not on file   • Highest education level: Not on file   Tobacco Use   • Smoking status: Former Smoker     Packs/day: 1.00     Years: 25.00     Pack years: 25.00     Types: Cigarettes     Last attempt to quit: 2018     Years since quittin.9   • Smokeless tobacco: Never Used   • Tobacco comment: has not smoked in 3 months   Substance and Sexual Activity   • Alcohol use: No   • Drug use: No   • Sexual activity: Defer   Social History Narrative    Patient consumes 2-3 cups coffee daily.     Patient lives at home with .       Current Outpatient Medications on File Prior to Visit   Medication Sig Dispense Refill   • aspirin 81 MG tablet Take 81 mg by mouth As Needed. 30 tablet 11   • escitalopram (LEXAPRO) 10 MG tablet Take 1 tablet by mouth Daily. 30 tablet 2   • meloxicam (MOBIC) 15 MG tablet 1 PO Daily with food. 60 tablet 0   • raNITIdine (ZANTAC) 150 MG tablet Take 1 tablet by mouth 2 (Two) Times a Day. (Patient taking differently: Take 150 mg by mouth As Needed.) 60 tablet 5   • temazepam (RESTORIL) 15 MG capsule Take 1 capsule by mouth At Night As Needed for Sleep. 30 capsule 1     No current facility-administered medications on file prior to visit.       Allergies   Allergen Reactions   • Biaxin [Clarithromycin] Confusion     TABS        Review of Systems   Constitutional: Negative.    HENT: Negative.    Eyes: Negative.    Respiratory: Negative.    Cardiovascular: Negative.    Gastrointestinal: Negative.    Endocrine: Negative.    Genitourinary: Negative.    Musculoskeletal: Positive for arthralgias and joint swelling.   Skin: Negative.    Allergic/Immunologic: Negative.    Neurological: Negative.    Hematological: Negative.    Psychiatric/Behavioral: Negative.         The patient's Review  "of Systems was personally reviewed and confirmed as accurate.    Physical Exam  Pulse 70, height 167.6 cm (65.98\"), weight 85.3 kg (188 lb), SpO2 98 %, not currently breastfeeding.    Body mass index is 30.36 kg/m².    GENERAL APPEARANCE: awake, alert, oriented, in no acute distress and well developed, well nourished  LUNGS:  breathing nonlabored  EXTREMITIES: no clubbing, cyanosis  PERIPHERAL PULSES: palpable dorsalis pedis and posterior tibial pulses bilaterally.    GAIT:  Antalgic        ----------  Right Knee Exam:  ----------  ALIGNMENT: neutral  ----------  RANGE OF MOTION:  Normal (0-120 degrees) with no extensor lag or flexion contracture  LIGAMENTOUS STABILITY:   stable to varus and valgus stress at terminal extension and 30 degrees without any evidence of laxity  ----------  STRENGTH:  KNEE FLEXION 5/5  KNEE EXTENSION  5/5  ANKLE DORSIFLEXION  5/5  ANKLE PLANTARFLEXION  5/5  ----------  PAIN WITH PALPATION: Anterior knee  KNEE EFFUSION:  Trace  PAIN WITH KNEE ROM: yes  PATELLAR CREPITUS:  no  ----------  SENSATION TO LIGHT TOUCH:  DEEP PERONEAL/SUPERFICIAL PERONEAL/SURAL/SAPHENOUS/TIBIAL:    intact  ----------  EDEMA:  no  ERYTHEMA:    no  WOUNDS/INCISIONS:   no  _____________________________________________________________________  _____________________________________________________________________    RADIOGRAPHIC FINDINGS:   No new imaging today    Assessment/Plan:   Diagnosis Plan   1. Primary osteoarthritis of right knee  Large Joint Arthrocentesis: R knee   2. Right knee pain, unspecified chronicity  XR Knee 4+ View Right     Patient symptoms are consistent with arthritis.  Recommended a cortisone injection in the right knee today.  She is agreeable to this.  She will follow-up in 3 months.    Procedure Note:  I discussed with the patient the potential benefits of performing a therapeutic injection of the right knee as well as potential risks including but not limited to infection, swelling, pain, " bleeding, bruising, nerve/vessel damage, skin color changes, transient elevation in blood glucose levels, and fat atrophy. After informed consent and after the area was prepped with alcohol, ethyl chloride was used to numb the skin. Via the superior lateral approach, 3cc of 1% lidocaine, 3cc of 0.25% bupivicaine and 2 cc of 40mg/ml of Kenalog were injected into the right knee. The patient tolerated the procedure well. There were no complications. A sterile dressing was placed over the injection site.        Donny Lehman MD  12/31/19  10:42 AM

## 2019-12-31 NOTE — PROGRESS NOTES
Procedure   Large Joint Arthrocentesis: R knee  Date/Time: 12/31/2019 10:39 AM  Consent given by: patient  Site marked: site marked  Timeout: Immediately prior to procedure a time out was called to verify the correct patient, procedure, equipment, support staff and site/side marked as required   Supporting Documentation  Indications: pain   Procedure Details  Location: knee - R knee  Preparation: Patient was prepped and draped in the usual sterile fashion  Needle size: 22 G  Approach: anterolateral  Medications administered: 3 mL bupivacaine (PF) 0.25 %; 80 mg triamcinolone acetonide 40 MG/ML; 3 mL lidocaine PF 1% 1 %  Patient tolerance: patient tolerated the procedure well with no immediate complications

## 2020-04-23 ENCOUNTER — OFFICE VISIT (OUTPATIENT)
Dept: FAMILY MEDICINE CLINIC | Facility: CLINIC | Age: 60
End: 2020-04-23

## 2020-04-23 VITALS
TEMPERATURE: 98 F | BODY MASS INDEX: 31.79 KG/M2 | HEIGHT: 66 IN | HEART RATE: 68 BPM | RESPIRATION RATE: 16 BRPM | DIASTOLIC BLOOD PRESSURE: 72 MMHG | SYSTOLIC BLOOD PRESSURE: 115 MMHG | WEIGHT: 197.8 LBS

## 2020-04-23 DIAGNOSIS — H60.20 ACUTE MALIGNANT OTITIS EXTERNA, UNSPECIFIED LATERALITY: Primary | ICD-10-CM

## 2020-04-23 DIAGNOSIS — L29.9 ITCHING WITH IRRITATION: ICD-10-CM

## 2020-04-23 PROCEDURE — 96372 THER/PROPH/DIAG INJ SC/IM: CPT | Performed by: NURSE PRACTITIONER

## 2020-04-23 PROCEDURE — 99214 OFFICE O/P EST MOD 30 MIN: CPT | Performed by: NURSE PRACTITIONER

## 2020-04-23 RX ORDER — LORATADINE 10 MG/1
10 TABLET ORAL DAILY
Qty: 30 TABLET | Refills: 2 | Status: SHIPPED | OUTPATIENT
Start: 2020-04-23 | End: 2021-04-05

## 2020-04-23 RX ORDER — TRIAMCINOLONE ACETONIDE 40 MG/ML
40 INJECTION, SUSPENSION INTRA-ARTICULAR; INTRAMUSCULAR ONCE
Status: DISCONTINUED | OUTPATIENT
Start: 2020-04-23 | End: 2020-04-23

## 2020-04-23 RX ORDER — TRIAMCINOLONE ACETONIDE 40 MG/ML
40 INJECTION, SUSPENSION INTRA-ARTICULAR; INTRAMUSCULAR ONCE
Status: COMPLETED | OUTPATIENT
Start: 2020-04-23 | End: 2020-04-23

## 2020-04-23 RX ADMIN — TRIAMCINOLONE ACETONIDE 40 MG: 40 INJECTION, SUSPENSION INTRA-ARTICULAR; INTRAMUSCULAR at 10:53

## 2020-04-23 NOTE — PROGRESS NOTES
Subjective   Dede Jerez is a 59 y.o. female.     History of Present Illness   Left ear pain for the past several months  Feeling drainage, shooting sharp pains at times, itching,   Thinks she is having an allergic reaction to something  Feeling itchy on nose, lips, inside mouth, roof of mouth and throat, PND and skin on her neck.  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    Review of Systems   Constitutional: Negative for activity change, chills and fever.   HENT: Positive for ear pain. Negative for congestion, rhinorrhea, sinus pressure, sore throat, swollen glands and trouble swallowing.    Eyes: Negative.  Negative for pain, redness and itching.   Respiratory: Negative.  Negative for cough, chest tightness and shortness of breath.    Cardiovascular: Negative.    Gastrointestinal: Negative.  Negative for nausea and vomiting.   Musculoskeletal: Negative.    Skin: Positive for color change.        Itching and irritation of skin on neck, lips, throat   Allergic/Immunologic: Positive for environmental allergies.   Neurological: Negative for dizziness, light-headedness and headache.   Hematological: Negative.    Psychiatric/Behavioral: Negative.        Objective   Physical Exam   Constitutional: She is oriented to person, place, and time. She appears well-developed and well-nourished. No distress.   HENT:   Head: Normocephalic and atraumatic.   Right  Ear: Hearing, tympanic membrane, external ear and ear canal normal. No swelling or tenderness. Tympanic membrane is not injected, not erythematous, not retracted and not bulging. No middle ear effusion. cerumen impaction is not present.  Left Ear: Hearing, tympanic membrane and external ear normal. There is tenderness (redness in left ear canal). No drainage or swelling. No mastoid tenderness. Tympanic membrane is not injected, not erythematous, not retracted and not bulging.  No middle ear effusion. An impacted cerumen is not present.  Nose: Nose normal.   Mouth/Throat: Oropharynx is clear and moist. No oropharyngeal exudate.   Eyes: Lids are normal.   Neck: Trachea normal. Neck supple. No thyroid mass and no thyromegaly present.   Cardiovascular: Normal rate, regular rhythm, S1 normal, S2 normal and normal heart sounds.   Pulmonary/Chest: Effort normal and breath sounds normal.   Lymphadenopathy:        Head (right side): No tonsillar, no preauricular, no posterior auricular and no occipital adenopathy present.        Head (left side): No tonsillar, no preauricular, no posterior auricular and no occipital adenopathy present.     She has no cervical adenopathy.        Right cervical: No posterior cervical adenopathy present.       Left cervical: No posterior cervical adenopathy present.   Neurological: She is alert and oriented to person, place, and time.   Skin: Skin is warm and dry. Rash (several maculopapules on upper oral pharnyx) noted. She is not diaphoretic. There is erythema (mild erythema on skin of neck).   Psychiatric: She has a normal mood and affect. Her behavior is normal. Judgment and thought content normal.   Nursing note and vitals reviewed.        Assessment/Plan   Dede was seen today for left ear pain and itching in mouth.    Diagnoses and all orders for this visit:    Acute malignant otitis externa, unspecified laterality  -     neomycin-polymyxin-hydrocortisone (CORTISPORIN) 3.5-80457-6 otic  solution; Administer 3 drops into the left ear 4 (Four) Times a Day.    Itching with irritation  -     triamcinolone acetonide (KENALOG-40) injection 40 mg    Other orders  -     loratadine (Claritin) 10 MG tablet; Take 1 tablet by mouth Daily.    will have pt use some ear drops to see if this will help with the ear pain  Will give pt an injection of Kenalog use hypoallergenic laundry soap and body wash also avoid harsh chemicals  Use warm saline gargles for throat  Take Benadryl OTC as needed  F/U if not improving

## 2020-06-04 ENCOUNTER — OFFICE VISIT (OUTPATIENT)
Dept: FAMILY MEDICINE CLINIC | Facility: CLINIC | Age: 60
End: 2020-06-04

## 2020-06-04 ENCOUNTER — HOSPITAL ENCOUNTER (OUTPATIENT)
Dept: GENERAL RADIOLOGY | Facility: HOSPITAL | Age: 60
Discharge: HOME | End: 2020-06-04
Admitting: PHYSICIAN ASSISTANT

## 2020-06-04 VITALS
HEIGHT: 66 IN | HEART RATE: 68 BPM | DIASTOLIC BLOOD PRESSURE: 64 MMHG | BODY MASS INDEX: 31.58 KG/M2 | SYSTOLIC BLOOD PRESSURE: 108 MMHG | TEMPERATURE: 97.6 F | WEIGHT: 196.5 LBS | RESPIRATION RATE: 18 BRPM

## 2020-06-04 DIAGNOSIS — R21 FACIAL RASH: ICD-10-CM

## 2020-06-04 DIAGNOSIS — R10.10 PAIN OF UPPER ABDOMEN: Primary | ICD-10-CM

## 2020-06-04 DIAGNOSIS — E78.00 ELEVATED CHOLESTEROL: ICD-10-CM

## 2020-06-04 DIAGNOSIS — H60.333 CHRONIC SWIMMER'S EAR OF BOTH SIDES: ICD-10-CM

## 2020-06-04 DIAGNOSIS — Z12.11 SCREENING FOR COLON CANCER: ICD-10-CM

## 2020-06-04 DIAGNOSIS — J34.89 NASAL SORE: ICD-10-CM

## 2020-06-04 DIAGNOSIS — Z51.81 MEDICATION MONITORING ENCOUNTER: ICD-10-CM

## 2020-06-04 DIAGNOSIS — K59.04 CHRONIC IDIOPATHIC CONSTIPATION: ICD-10-CM

## 2020-06-04 DIAGNOSIS — R53.82 CHRONIC FATIGUE: ICD-10-CM

## 2020-06-04 DIAGNOSIS — E55.9 VITAMIN D DEFICIENCY: ICD-10-CM

## 2020-06-04 LAB
BILIRUB BLD-MCNC: NEGATIVE MG/DL
CLARITY, POC: CLEAR
COLOR UR: YELLOW
GLUCOSE UR STRIP-MCNC: NEGATIVE MG/DL
KETONES UR QL: NEGATIVE
LEUKOCYTE EST, POC: NEGATIVE
NITRITE UR-MCNC: NEGATIVE MG/ML
PH UR: 6 [PH] (ref 5–8)
PROT UR STRIP-MCNC: NEGATIVE MG/DL
RBC # UR STRIP: NEGATIVE /UL
SP GR UR: 1.03 (ref 1–1.03)
UROBILINOGEN UR QL: NORMAL

## 2020-06-04 PROCEDURE — 81003 URINALYSIS AUTO W/O SCOPE: CPT | Performed by: PHYSICIAN ASSISTANT

## 2020-06-04 PROCEDURE — 99214 OFFICE O/P EST MOD 30 MIN: CPT | Performed by: PHYSICIAN ASSISTANT

## 2020-06-04 PROCEDURE — 74018 RADEX ABDOMEN 1 VIEW: CPT

## 2020-06-04 RX ORDER — DICYCLOMINE HCL 20 MG
20 TABLET ORAL EVERY 6 HOURS
Qty: 120 TABLET | Refills: 0 | Status: SHIPPED | OUTPATIENT
Start: 2020-06-04 | End: 2021-04-05

## 2020-06-04 RX ORDER — CLOTRIMAZOLE AND BETAMETHASONE DIPROPIONATE 10; .64 MG/G; MG/G
CREAM TOPICAL 2 TIMES DAILY
Qty: 15 G | Refills: 0 | Status: SHIPPED | OUTPATIENT
Start: 2020-06-04 | End: 2020-09-25

## 2020-06-04 NOTE — PROGRESS NOTES
Subjective   Dede Jerez is a 59 y.o. female.     History of Present Illness   Pt presents with CC of abdominal pain. Started a couple weeks ago.   Hurts to cough or be in certain body positions. Will get sharp pain that lasts for a few seconds    Feels like bowel movents are hard, sometimes goes a week without BM. No hemorrhoids. A lot of straining   Then loose stools for several days that are hard to control   Then will have a lot of gas  Eating a lot of nuts lately. Pecans, cashews, almonds. Stopped this a few days ago   No nausea vomiting  Has a good appetite   No urinary symptoms   Has had colonoscopy in the past. Records not in chart, unsure what they found. Pt does want to have another one   No vaginal discharge or itching   Has pain regardless of soft or hard stools   Still has gallbladder, appendix, uterus and ovaries  No issues with diverticulitis   Passing gas a lot with gas-x     Has been lifting a lot and moving furniture. Push mowing yard   Thoracic back pain on and off for over a year    Has rash under nose that is itching   Sore spot on L nasal passage. Has had this before in April. Thinks it is irritated from mask use     Has had ablations in the past SVT. No significant palpitations    Requesting refill on ear drop. Has recurrent swimmer's ear. Denies any current discomfort     Using a lot of fixadent for dentures. Worried because warning says there is risk of zinc toxicity if used too much. Having to use more due to poorly fitting upper dentures. Has not followed up with dentist yet     The following portions of the patient's history were reviewed and updated as appropriate: allergies, current medications, past family history, past medical history, past social history, past surgical history and problem list.    Review of Systems   Constitutional: Positive for fatigue. Negative for chills, diaphoresis and fever.   HENT: Positive for dental problem. Negative for congestion, ear discharge, ear  "pain, hearing loss, nosebleeds, postnasal drip, sinus pressure, sneezing and sore throat.    Eyes: Negative.    Respiratory: Negative.  Negative for cough, chest tightness, shortness of breath and wheezing.    Cardiovascular: Negative.  Negative for chest pain, palpitations and leg swelling.   Gastrointestinal: Positive for abdominal pain, constipation and diarrhea. Negative for abdominal distention, blood in stool, nausea and vomiting.   Genitourinary: Negative.  Negative for difficulty urinating, dysuria, flank pain, frequency, hematuria and urgency.   Musculoskeletal: Positive for back pain. Negative for arthralgias, gait problem, joint swelling, myalgias, neck pain and neck stiffness.   Skin: Positive for rash. Negative for color change, pallor and wound.   Neurological: Negative for dizziness, syncope, weakness, light-headedness, numbness and headaches.       Objective    Blood pressure 108/64, pulse 68, temperature 97.6 °F (36.4 °C), resp. rate 18, height 167.6 cm (66\"), weight 89.1 kg (196 lb 8 oz), not currently breastfeeding.     Physical Exam   Constitutional: She is oriented to person, place, and time. She appears well-developed and well-nourished.   HENT:   Head: Normocephalic and atraumatic.   Right Ear: Tympanic membrane, external ear and ear canal normal.   Left Ear: Tympanic membrane, external ear and ear canal normal.   Nose: Mucosal edema present.   Mouth/Throat: Oropharynx is clear and moist. No oropharyngeal exudate, posterior oropharyngeal edema or posterior oropharyngeal erythema.   Small area of redness and scabbing along L nasal cavity    Eyes: Conjunctivae are normal.   Neck: Normal range of motion. Neck supple. No tracheal deviation present. No thyromegaly present.   Cardiovascular: Normal rate, regular rhythm, normal heart sounds and intact distal pulses.   Pulmonary/Chest: Effort normal and breath sounds normal. No respiratory distress. She has no wheezes. She has no rales. She exhibits " no tenderness.   Abdominal: Soft. Bowel sounds are normal. She exhibits no distension and no mass. There is tenderness in the left upper quadrant. There is no rebound and no guarding. No hernia.   Musculoskeletal:        Thoracic back: She exhibits tenderness. She exhibits normal range of motion, no bony tenderness, no pain and no spasm.   Lymphadenopathy:     She has no cervical adenopathy.   Neurological: She is alert and oriented to person, place, and time.   Skin: Skin is warm and dry.   Erythematous, inflamed, papular skin rash around nasal folds bilaterally    Psychiatric: She has a normal mood and affect. Her behavior is normal. Judgment and thought content normal.   Nursing note and vitals reviewed.      Assessment/Plan   Dede was seen today for abdominal pain.    Diagnoses and all orders for this visit:    Pain of upper abdomen  -     CBC & Differential  -     Comprehensive Metabolic Panel  -     Lipase  -     XR Abdomen 1 View  -     dicyclomine (Bentyl) 20 MG tablet; Take 1 tablet by mouth Every 6 (Six) Hours.  -     POCT urinalysis dipstick, multipro    Chronic swimmer's ear of both sides  -     neomycin-polymyxin-hydrocortisone (CORTISPORIN) 3.5-76078-1 otic solution; Administer 3 drops into the left ear 4 (Four) Times a Day.    Facial rash  -     clotrimazole-betamethasone (Lotrisone) 1-0.05 % cream; Apply  topically to the appropriate area as directed 2 (Two) Times a Day.  -     HSV 1 & 2 IgM, Antibodies, Indirect    Nasal sore  -     mupirocin (Bactroban) 2 % ointment; Apply  topically to the appropriate area as directed 3 (Three) Times a Day.    Chronic fatigue  -     TSH  -     T4, free  -     Vitamin B12  -     Iron Profile    Elevated cholesterol  -     Lipid Panel    Vitamin D deficiency  -     Vitamin D 1,25 Dihydroxy    Medication monitoring encounter  -     Zinc    Chronic idiopathic constipation  -     XR Abdomen 1 View    Screening for colon cancer  -     Ambulatory Referral For Screening  Colonoscopy    check labs as noted   UA normal   Referral for screening colonoscopy placed   Check abdominal XR, concern for constipation. Encourage patient to do trial of increased fiber and miralax as needed. Trial of bentyl   Back pain seems muscular. Rest, heat, and NSAID   Topical treatment for rash as noted. Mupirocin for sore.   Report to ER if new or worsening abdominal pain develops

## 2020-06-10 LAB
1,25(OH)2D3 SERPL-MCNC: 45.2 PG/ML (ref 19.9–79.3)
ALBUMIN SERPL-MCNC: 4.6 G/DL (ref 3.5–5.2)
ALBUMIN/GLOB SERPL: 2.2 G/DL
ALP SERPL-CCNC: 50 U/L (ref 39–117)
ALT SERPL-CCNC: 16 U/L (ref 1–33)
AST SERPL-CCNC: 19 U/L (ref 1–32)
BASOPHILS # BLD AUTO: NORMAL 10*3/UL
BILIRUB SERPL-MCNC: 0.2 MG/DL (ref 0.2–1.2)
BUN SERPL-MCNC: 20 MG/DL (ref 8–23)
BUN/CREAT SERPL: 26.3 (ref 7–25)
CALCIUM SERPL-MCNC: 9.7 MG/DL (ref 8.6–10.5)
CHLORIDE SERPL-SCNC: 104 MMOL/L (ref 98–107)
CHOLEST SERPL-MCNC: 186 MG/DL (ref 0–200)
CO2 SERPL-SCNC: 29.6 MMOL/L (ref 22–29)
CREAT SERPL-MCNC: 0.76 MG/DL (ref 0.57–1)
DIFFERENTIAL COMMENT: NORMAL
EOSINOPHIL # BLD AUTO: NORMAL 10*3/UL
EOSINOPHIL # BLD MANUAL: 0.22 10*3/MM3 (ref 0–0.4)
EOSINOPHIL NFR BLD AUTO: NORMAL %
EOSINOPHIL NFR BLD MANUAL: 3.1 % (ref 0.3–6.2)
ERYTHROCYTE [DISTWIDTH] IN BLOOD BY AUTOMATED COUNT: 13.1 % (ref 12.3–15.4)
GLOBULIN SER CALC-MCNC: 2.1 GM/DL
GLUCOSE SERPL-MCNC: 96 MG/DL (ref 65–99)
HCT VFR BLD AUTO: 39.3 % (ref 34–46.6)
HDLC SERPL-MCNC: 80 MG/DL (ref 40–60)
HGB BLD-MCNC: 12.8 G/DL (ref 12–15.9)
IRON SATN MFR SERPL: 18 % (ref 20–50)
IRON SERPL-MCNC: 68 MCG/DL (ref 37–145)
LDLC SERPL CALC-MCNC: 90 MG/DL (ref 0–100)
LIPASE SERPL-CCNC: 20 U/L (ref 13–60)
LYMPHOCYTES # BLD AUTO: NORMAL 10*3/UL
LYMPHOCYTES # BLD MANUAL: 2.77 10*3/MM3 (ref 0.7–3.1)
LYMPHOCYTES NFR BLD AUTO: NORMAL %
LYMPHOCYTES NFR BLD MANUAL: 38.8 % (ref 19.6–45.3)
MCH RBC QN AUTO: 30.1 PG (ref 26.6–33)
MCHC RBC AUTO-ENTMCNC: 32.6 G/DL (ref 31.5–35.7)
MCV RBC AUTO: 92.5 FL (ref 79–97)
MONOCYTES # BLD MANUAL: 0.59 10*3/MM3 (ref 0.1–0.9)
MONOCYTES NFR BLD AUTO: NORMAL %
MONOCYTES NFR BLD MANUAL: 8.2 % (ref 5–12)
NEUTROPHILS # BLD MANUAL: 3.57 10*3/MM3 (ref 1.7–7)
NEUTROPHILS NFR BLD AUTO: NORMAL %
NEUTROPHILS NFR BLD MANUAL: 50 % (ref 42.7–76)
PLATELET # BLD AUTO: 245 10*3/MM3 (ref 140–450)
PLATELET BLD QL SMEAR: NORMAL
POTASSIUM SERPL-SCNC: 5.2 MMOL/L (ref 3.5–5.2)
PROT SERPL-MCNC: 6.7 G/DL (ref 6–8.5)
RBC # BLD AUTO: 4.25 10*6/MM3 (ref 3.77–5.28)
RBC MORPH BLD: NORMAL
SODIUM SERPL-SCNC: 139 MMOL/L (ref 136–145)
T4 FREE SERPL-MCNC: 1.3 NG/DL (ref 0.93–1.7)
TIBC SERPL-MCNC: 379 MCG/DL
TRIGL SERPL-MCNC: 82 MG/DL (ref 0–150)
TSH SERPL DL<=0.005 MIU/L-ACNC: 1.46 UIU/ML (ref 0.27–4.2)
UIBC SERPL-MCNC: 311 MCG/DL (ref 112–346)
VIT B12 SERPL-MCNC: 624 PG/ML (ref 211–946)
VLDLC SERPL CALC-MCNC: 16.4 MG/DL (ref 5–40)
WBC # BLD AUTO: 7.14 10*3/MM3 (ref 3.4–10.8)
ZINC SERPL-MCNC: 96 UG/DL (ref 56–134)

## 2020-09-25 ENCOUNTER — OFFICE VISIT (OUTPATIENT)
Dept: FAMILY MEDICINE CLINIC | Facility: CLINIC | Age: 60
End: 2020-09-25

## 2020-09-25 VITALS
SYSTOLIC BLOOD PRESSURE: 128 MMHG | RESPIRATION RATE: 16 BRPM | WEIGHT: 199.6 LBS | HEART RATE: 68 BPM | HEIGHT: 66 IN | TEMPERATURE: 98.2 F | DIASTOLIC BLOOD PRESSURE: 78 MMHG | BODY MASS INDEX: 32.08 KG/M2

## 2020-09-25 DIAGNOSIS — F33.1 MODERATE EPISODE OF RECURRENT MAJOR DEPRESSIVE DISORDER (HCC): Primary | ICD-10-CM

## 2020-09-25 PROCEDURE — 99213 OFFICE O/P EST LOW 20 MIN: CPT | Performed by: NURSE PRACTITIONER

## 2020-09-25 RX ORDER — AZITHROMYCIN 250 MG/1
TABLET, FILM COATED ORAL
COMMUNITY
Start: 2020-09-23 | End: 2020-10-06

## 2020-09-25 RX ORDER — VENLAFAXINE HYDROCHLORIDE 75 MG/1
75 CAPSULE, EXTENDED RELEASE ORAL DAILY
Qty: 30 CAPSULE | Refills: 1 | Status: SHIPPED | OUTPATIENT
Start: 2020-09-25 | End: 2020-10-06 | Stop reason: SDUPTHER

## 2020-09-25 NOTE — PROGRESS NOTES
Subjective   Dede Jerez is a 60 y.o. female.     History of Present Illness   ER follow up   Styes in both eyes, taking medication to treat  Under a lot of stress and think it is related    Struggling with depression after death of  dec 2018 due to cancer she was the care giver, grandson ottoniel boland, daughter with shunt  Under a lot of stress for the past 4 years  Was doing talk therapy and did help some  Lashing out at others, cannot stand to be alone panic attack, extreme mood swings, night castro, survivors guilt wonders if she should have done anything differently  She is dating but she lashes out, relies a lot on her  Has energy not fatigue. Not sleeping. Not suicidal.   Ready to start some medication; does not want to be a burden anymore to her daughter who she has been relying on for support.  Has been on Wellbutrin and Lexapro in the past and did not really do well on them  Can have 2 or 3 good days then she is back to being irritable.    The following portions of the patient's history were reviewed and updated as appropriate: allergies, current medications, past family history, past medical history, past social history, past surgical history and problem list.    Review of Systems   Constitutional: Positive for activity change, appetite change and fatigue. Negative for unexpected weight gain and unexpected weight loss.   HENT: Negative.    Eyes: Negative.    Respiratory: Negative.    Cardiovascular: Negative.    Gastrointestinal: Negative.    Endocrine: Negative.  Negative for polydipsia, polyphagia and polyuria.   Skin: Positive for color change.   Allergic/Immunologic: Negative.    Neurological: Negative.    Hematological: Negative.    Psychiatric/Behavioral: Positive for sleep disturbance, depressed mood and stress. The patient is nervous/anxious.        Objective   Physical Exam  Vitals signs and nursing note reviewed.   Constitutional:       Appearance: Normal appearance. She is  well-developed.   HENT:      Head: Normocephalic.      Right Ear: External ear normal.      Left Ear: External ear normal.      Nose: Nose normal.   Eyes:      General: Lids are normal.      Comments: Right lower lid stye, red and mildly swollen, Left upper lid red and swollen with stye   Neck:      Musculoskeletal: Neck supple.   Cardiovascular:      Rate and Rhythm: Normal rate and regular rhythm.      Heart sounds: Normal heart sounds, S1 normal and S2 normal. No murmur. No friction rub. No gallop.    Pulmonary:      Effort: Pulmonary effort is normal.      Breath sounds: Normal breath sounds.   Abdominal:      General: Bowel sounds are normal.      Palpations: Abdomen is soft.      Tenderness: There is no abdominal tenderness.   Lymphadenopathy:      Cervical: No cervical adenopathy.   Skin:     General: Skin is warm and dry.   Neurological:      Mental Status: She is alert and oriented to person, place, and time.   Psychiatric:         Mood and Affect: Mood is anxious and depressed. Affect is tearful.         Speech: Speech normal.         Behavior: Behavior normal. Behavior is cooperative.         Thought Content: Thought content normal.         Judgment: Judgment normal.           Assessment/Plan   Dede was seen today for fu from [facility] er / bilateral styes and fu on anxiety.    Diagnoses and all orders for this visit:    Moderate episode of recurrent major depressive disorder (CMS/HCC)  -     venlafaxine XR (Effexor XR) 75 MG 24 hr capsule; Take 1 capsule by mouth Daily.      Will obtain records from [facility] ER visit and review  Will start pt on Venlafaxine and see pt back in 2 weeks  Pt agrees

## 2020-10-06 ENCOUNTER — OFFICE VISIT (OUTPATIENT)
Dept: FAMILY MEDICINE CLINIC | Facility: CLINIC | Age: 60
End: 2020-10-06

## 2020-10-06 VITALS
WEIGHT: 200 LBS | HEART RATE: 74 BPM | BODY MASS INDEX: 32.14 KG/M2 | TEMPERATURE: 97.7 F | SYSTOLIC BLOOD PRESSURE: 122 MMHG | RESPIRATION RATE: 18 BRPM | HEIGHT: 66 IN | DIASTOLIC BLOOD PRESSURE: 84 MMHG

## 2020-10-06 DIAGNOSIS — H00.011 HORDEOLUM EXTERNUM OF RIGHT UPPER EYELID: ICD-10-CM

## 2020-10-06 DIAGNOSIS — H00.014 HORDEOLUM EXTERNUM OF LEFT UPPER EYELID: ICD-10-CM

## 2020-10-06 DIAGNOSIS — F33.1 MODERATE EPISODE OF RECURRENT MAJOR DEPRESSIVE DISORDER (HCC): Primary | ICD-10-CM

## 2020-10-06 PROCEDURE — 90686 IIV4 VACC NO PRSV 0.5 ML IM: CPT | Performed by: FAMILY MEDICINE

## 2020-10-06 PROCEDURE — 99213 OFFICE O/P EST LOW 20 MIN: CPT | Performed by: FAMILY MEDICINE

## 2020-10-06 PROCEDURE — 90471 IMMUNIZATION ADMIN: CPT | Performed by: FAMILY MEDICINE

## 2020-10-06 RX ORDER — VENLAFAXINE HYDROCHLORIDE 75 MG/1
75 CAPSULE, EXTENDED RELEASE ORAL DAILY
Qty: 90 CAPSULE | Refills: 1 | Status: SHIPPED | OUTPATIENT
Start: 2020-10-06 | End: 2021-08-17

## 2020-10-06 NOTE — PROGRESS NOTES
Subjective   Dede Jerez is a 60 y.o. female.     History of Present Illness     She has been on effexor for her mood and she is feeling like it has made a positive impact  The bees like sensation in her skin and head is gone  No longer crying and no longer having panic  She has been really happy with the 75 mg      She has had several styes on her eye lids for the last 2 months  She has had them on both the right and the left  They are swollen quite a bit with this  She has seen [facility] and ER for this on several occasions  She was placed on oral antibiotics (Zpac) which helped quite a bit        Review of Systems   Constitutional: Negative.    Eyes:        Hpi       Objective   Physical Exam  Vitals signs and nursing note reviewed.   Constitutional:       General: She is not in acute distress.     Appearance: Normal appearance. She is well-developed.   Eyes:     Cardiovascular:      Rate and Rhythm: Normal rate and regular rhythm.      Heart sounds: Normal heart sounds.   Pulmonary:      Effort: Pulmonary effort is normal.      Breath sounds: Normal breath sounds.   Neurological:      Mental Status: She is alert and oriented to person, place, and time.   Psychiatric:         Mood and Affect: Mood normal.         Behavior: Behavior normal.         Thought Content: Thought content normal.         Judgment: Judgment normal.         Assessment/Plan   Diagnoses and all orders for this visit:    Moderate episode of recurrent major depressive disorder (CMS/HCC)  -     venlafaxine XR (Effexor XR) 75 MG 24 hr capsule; Take 1 capsule by mouth Daily.    Hordeolum externum of left upper eyelid  -     Ambulatory Referral to Ophthalmology    Hordeolum externum of right upper eyelid  -     Ambulatory Referral to Ophthalmology    Other orders  -     FluLaval Quad >6 Months ([id number])    mood doing well with effexor, will continue this dose  Eye doc for recurrent styes.  Will f/u with specialist

## 2021-04-05 ENCOUNTER — OFFICE VISIT (OUTPATIENT)
Dept: CARDIOLOGY | Facility: CLINIC | Age: 61
End: 2021-04-05

## 2021-04-05 VITALS
DIASTOLIC BLOOD PRESSURE: 68 MMHG | TEMPERATURE: 97.8 F | WEIGHT: 212.6 LBS | OXYGEN SATURATION: 92 % | SYSTOLIC BLOOD PRESSURE: 122 MMHG | HEART RATE: 46 BPM | HEIGHT: 66 IN | BODY MASS INDEX: 34.17 KG/M2

## 2021-04-05 DIAGNOSIS — R07.89 CHEST PAIN, ATYPICAL: Primary | ICD-10-CM

## 2021-04-05 PROCEDURE — 99213 OFFICE O/P EST LOW 20 MIN: CPT | Performed by: PHYSICIAN ASSISTANT

## 2021-04-05 RX ORDER — HYDROCORTISONE ACETATE 0.5 %
CREAM (GRAM) TOPICAL
COMMUNITY
End: 2021-08-09

## 2021-04-05 RX ORDER — ASPIRIN 81 MG/1
81 TABLET ORAL DAILY
COMMUNITY
End: 2021-08-09

## 2021-04-05 RX ORDER — CHLORAL HYDRATE 500 MG
CAPSULE ORAL
COMMUNITY
End: 2021-08-09

## 2021-04-05 NOTE — PROGRESS NOTES
[facility] at [facility]   OFFICE NOTE      Dede Jerez  1960  PCP: Quinn Gonzales MD    SUBJECTIVE:   Dede Jerez is a 60 y.o. female seen for a follow up visit regarding the following:     CC:SVT    HPI:   Since we last saw the pt, she has been to  Hospital ER for chest pain. She had negative EKG's and markers. She is under stress has her step granddaugher  last week and she is under severe stress. She thinks this is why she had chest pain. She   denies any palpitatons episodes, SOB,  LH, and dizziness. Denies any hospitalizations, ER visits, bleeding, or TIA/CVA symptoms. Overall feels well.    Cardiac PMH: (Old records have been reviewed and summarized below)  1. SVT              A. Echocardiogram 17: EF 65%, mild TR              B. Event Monitor 17-17: runs of SVT at 150 bpm              C. EPS with RFA of AT x 3 17  2. Chest pain-atypical for angina, Negative  ER work up 2021  3. Anxiety  4. Tobacco Abuse Quit 2 years ago   5. GERD  6. Arthritis      Past Medical History, Past Surgical History, Family history, Social History, and Medications were all reviewed with the patient today and updated as necessary.       Current Outpatient Medications:   •  aspirin 81 MG EC tablet, Take 81 mg by mouth Daily., Disp: , Rfl:   •  Doxycycline Hyclate 120 MG tablet delayed-release, Take 100 mg by mouth Daily., Disp: , Rfl:   •  Glucosamine-Chondroit-Vit C-Mn (Glucosamine 1500 Complex) capsule, Take  by mouth., Disp: , Rfl:   •  Omega-3 Fatty Acids (fish oil) 1000 MG capsule capsule, Take  by mouth Daily With Breakfast., Disp: , Rfl:   •  venlafaxine XR (Effexor XR) 75 MG 24 hr capsule, Take 1 capsule by mouth Daily., Disp: 90 capsule, Rfl: 1      Allergies   Allergen Reactions   • Biaxin [Clarithromycin] Confusion     TABS     Patient Active Problem List   Diagnosis   • Arthritis   • MICHAEL (generalized anxiety disorder)   • Tobacco abuse   • Heart  palpitations   • SVT (supraventricular tachycardia) (CMS/HCC)   • Gastroesophageal reflux disease without esophagitis   • Fatigue   • Atrial tachycardia (CMS/HCC)     Past Medical History:   Diagnosis Date   • Acid reflux    • Anxiety     panic attack related throat constricting making swallowing hard at times    • Arrhythmia    • Cataract     bilat - mild    • Constipation     in past with dieting but fine now    • High cholesterol    • Measles    • Menopause    • Stroke (CMS/HCC)    • TIA (transient ischemic attack) 2010   • Wears glasses    • Wears partial dentures     bottom     Past Surgical History:   Procedure Laterality Date   • CARDIAC ELECTROPHYSIOLOGY PROCEDURE N/A 11/9/2017    Procedure: Ablation SVT, Hold Metoprolol for 5 days prior;  Surgeon: Manny Grimes MD;  Location: [facility] INVASIVE LOCATION;  Service:    • CARPAL TUNNEL RELEASE Bilateral    • COLONOSCOPY     • TUBAL ABDOMINAL LIGATION  1980     Family History   Problem Relation Age of Onset   • Heart attack Mother    • Coronary artery disease Mother    • Diabetes Mother    • Hypertension Mother    • Hypertension Father    • Hypertension Sister    • Diabetes Sister    • Hypertension Brother    • Diabetes Brother    • Hypertension Maternal Grandmother    • Hypertension Maternal Grandfather    • No Known Problems Paternal Grandmother    • No Known Problems Paternal Grandfather      Social History     Tobacco Use   • Smoking status: Former Smoker     Packs/day: 1.00     Years: 25.00     Pack years: 25.00     Types: Cigarettes     Quit date: 2018     Years since quitting: 3.2   • Smokeless tobacco: Never Used   • Tobacco comment: has not smoked in 3 months   Substance Use Topics   • Alcohol use: No       ROS:  Review of Symptoms:  General: no recent weight loss/gain, weakness or fatigue  Skin: no rashes, lumps, or other skin changes  HEENT: no dizziness, lightheadedness, or vision changes  Respiratory: no cough or hemoptysis  Cardiovascular: no  "palpitations, and tachycardia  Gastrointestinal: no black/tarry stools or diarrhea  Urinary: no change in frequency or urgency  Peripheral Vascular: no claudication or leg cramps  Musculoskeletal: no muscle or joint pain/stiffness  Psychiatric: no depression or excessive stress  Neurological: no sensory or motor loss, no syncope  Hematologic: no anemia, easy bruising or bleeding  Endocrine: no thyroid problems, nor heat or cold intolerance    PHYSICAL EXAM:    /68 (BP Location: Right arm, Patient Position: Sitting)   Pulse (!) 46   Temp 97.8 °F (36.6 °C)   Ht 167.6 cm (66\")   Wt 96.4 kg (212 lb 9.6 oz)   LMP  (LMP Unknown)   SpO2 92%   BMI 34.31 kg/m²        Wt Readings from Last 5 Encounters:   04/05/21 96.4 kg (212 lb 9.6 oz)   10/06/20 90.7 kg (200 lb)   09/25/20 90.5 kg (199 lb 9.6 oz)   06/04/20 89.1 kg (196 lb 8 oz)   04/23/20 89.7 kg (197 lb 12.8 oz)       BP Readings from Last 5 Encounters:   04/05/21 122/68   10/06/20 122/84   09/25/20 128/78   06/04/20 108/64   04/23/20 115/72       General appearance - Alert, well appearing, and in no distress   Mental status - Affect appropriate to mood.  Eyes - Sclerae anicteric,  ENMT - Hearing grossly normal bilaterally, Dental hygiene good.  Neck - Carotids upstroke normal bilaterally, no bruits, no JVD.  Resp - Clear to auscultation, no wheezes, rales or rhonchi, symmetric air entry.  Heart - Normal rate, regular rhythm, normal S1, S2, no murmurs, rubs, clicks or gallops.  GI - Soft, nontender, nondistended, no masses or organomegaly.  Neurological - Grossly intact - normal speech, no focal findings  Musculoskeletal - No joint tenderness, deformity or swelling, no muscular tenderness noted.  Extremities - Peripheral pulses normal, no pedal edema, no clubbing or cyanosis.  Skin - Normal coloration and turgor.  Psych -  oriented to person, place, and time.    Medical problems and test results were reviewed with the patient today.     No results found for " this or any previous visit (from the past 672 hour(s)).        ASSESSMENT   1. Chest pain-atypical features recent [facility] evaluation negative for EKG changes, negative cardiac markers  2.  SVT: Remote EP study and RFA of atrial tachycardia Dr. Grimes 2017.  Patient denies any recurrent palpitations  3.  Recent depression/anxiety      PLAN  · Long discussed with patient regarding her symptoms with chest pain.  Did offer her the option of pursuing GXT stress test rule ischemic heart disease.  · To focus on risk factor occasion, return to follow-up with her office in August 2020 or sooner as needed.            4/5/2021  13:33 EDT    Will Jourdan AZUL

## 2021-05-26 ENCOUNTER — TELEPHONE (OUTPATIENT)
Dept: FAMILY MEDICINE CLINIC | Facility: CLINIC | Age: 61
End: 2021-05-26

## 2021-05-26 DIAGNOSIS — Z12.11 SCREEN FOR COLON CANCER: Primary | ICD-10-CM

## 2021-05-26 NOTE — TELEPHONE ENCOUNTER
Caller: Dede Jerez    Relationship: Self    Best call back number: [phone]    What orders are you requesting (i.e. lab or imaging): COLONOSCOPY    In what timeframe would the patient need to come in: AUGUST 23-25 OR 30-31    Where will you receive your lab/imaging services: [facility]

## 2021-06-21 ENCOUNTER — TELEPHONE (OUTPATIENT)
Dept: FAMILY MEDICINE CLINIC | Facility: CLINIC | Age: 61
End: 2021-06-21

## 2021-06-21 NOTE — TELEPHONE ENCOUNTER
PT CALLED TO REQUEST AN MRI FOR HER RIGHT KNEE AND ALSO PRICE FOR OUT OF POCKET.    PLEASE ADVISE.  CALL BACK:[phone]

## 2021-06-23 ENCOUNTER — OFFICE VISIT (OUTPATIENT)
Dept: FAMILY MEDICINE CLINIC | Facility: CLINIC | Age: 61
End: 2021-06-23

## 2021-06-23 VITALS
HEIGHT: 66 IN | BODY MASS INDEX: 35.2 KG/M2 | TEMPERATURE: 98 F | RESPIRATION RATE: 18 BRPM | DIASTOLIC BLOOD PRESSURE: 72 MMHG | WEIGHT: 219 LBS | SYSTOLIC BLOOD PRESSURE: 118 MMHG | HEART RATE: 70 BPM

## 2021-06-23 DIAGNOSIS — G89.29 CHRONIC PAIN OF RIGHT KNEE: Primary | ICD-10-CM

## 2021-06-23 DIAGNOSIS — M25.561 CHRONIC PAIN OF RIGHT KNEE: Primary | ICD-10-CM

## 2021-06-23 PROCEDURE — 3008F BODY MASS INDEX DOCD: CPT | Performed by: FAMILY MEDICINE

## 2021-06-23 PROCEDURE — 99214 OFFICE O/P EST MOD 30 MIN: CPT | Performed by: FAMILY MEDICINE

## 2021-06-23 PROCEDURE — G8417 CALC BMI ABV UP PARAM F/U: HCPCS | Performed by: FAMILY MEDICINE

## 2021-06-23 RX ORDER — ETODOLAC 400 MG/1
400 TABLET, FILM COATED ORAL 2 TIMES DAILY
Qty: 60 TABLET | Refills: 1 | Status: SHIPPED | OUTPATIENT
Start: 2021-06-23 | End: 2021-08-09

## 2021-06-23 NOTE — PROGRESS NOTES
Subjective   Dede Jerez is a 60 y.o. female.     History of Present Illness     Her right knee has not felt right since she had a twisting injury and the knee popped  Then recently she had another mis-step and had worse right knee pain  Went to [facility] ER and had a normal ER  The knee is still hurting    She had injections in the knee with ortho in the past but only had 1 week relief with each steroid injection, simply did not get much benefit    she did like Dr. Lehman who did her injections and would be willing to see him again    Since she has the mis-step a couple weeks ago the right knee has been hurting worse  Hurts with staiors and inclines  Has not given out and not locked up.  Mild swelling on occasion  Walking makes this worse, rest and elevation can sometimes help alleviate pain      Review of Systems   Constitutional: Negative.    Musculoskeletal: Positive for gait problem (due to right knee pain).       Objective   Physical Exam  Vitals and nursing note reviewed.   Constitutional:       General: She is not in acute distress.     Appearance: Normal appearance. She is well-developed.   Cardiovascular:      Rate and Rhythm: Normal rate and regular rhythm.      Heart sounds: Normal heart sounds.   Pulmonary:      Effort: Pulmonary effort is normal.      Breath sounds: Normal breath sounds.   Musculoskeletal:        Legs:       Comments: Antalgic gait with right limp   Neurological:      Mental Status: She is alert and oriented to person, place, and time.   Psychiatric:         Mood and Affect: Mood normal.         Behavior: Behavior normal.         Thought Content: Thought content normal.         Judgment: Judgment normal.         Assessment/Plan   Diagnoses and all orders for this visit:    1. Chronic pain of right knee (Primary)  -     MRI Knee Right Without Contrast; Future  -     etodolac (LODINE) 400 MG tablet; Take 1 tablet by mouth 2 (Two) Times a Day.  Dispense: 60 tablet; Refill: 1    will  check MRI of knee with normal XR from ER.  I suspect meniscal injury that will likely need surgery since she has failed conservative therapy in the past.  Knee pain just getting worse and causing more issues.  F/u pending MRI  Naproxen BID

## 2021-07-09 ENCOUNTER — TELEPHONE (OUTPATIENT)
Dept: FAMILY MEDICINE CLINIC | Facility: CLINIC | Age: 61
End: 2021-07-09

## 2021-07-09 DIAGNOSIS — S83.206A TEAR OF MENISCUS OF RIGHT KNEE AS CURRENT INJURY, UNSPECIFIED MENISCUS, UNSPECIFIED TEAR TYPE, INITIAL ENCOUNTER: Primary | ICD-10-CM

## 2021-07-09 NOTE — TELEPHONE ENCOUNTER
\Caller: Dede Jerez    Relationship to patient: Self    Best call back number: [phone]     What is the call regarding:  PATIENT STATES THAT SHE DOES NOT WANT TO BE SCHEDULED WITH THE ORTHOPEDIC SURGERY SHE HAD PREVIOUSLY.  SHE IS ALSO GOING TO [place] ON Sunday AND WILL NOT BE BACK UNTIL AUGUST.  DOES SHE NEED TO WEAR A BRACE?

## 2021-08-03 ENCOUNTER — APPOINTMENT (OUTPATIENT)
Dept: PREADMISSION TESTING | Facility: HOSPITAL | Age: 61
End: 2021-08-03

## 2021-08-03 LAB — SARS-COV-2 RNA PNL SPEC NAA+PROBE: NOT DETECTED

## 2021-08-03 PROCEDURE — U0004 COV-19 TEST NON-CDC HGH THRU: HCPCS

## 2021-08-03 PROCEDURE — C9803 HOPD COVID-19 SPEC COLLECT: HCPCS

## 2021-08-06 ENCOUNTER — HOSPITAL ENCOUNTER (OUTPATIENT)
Dept: CARDIOLOGY | Facility: HOSPITAL | Age: 61
Discharge: HOME | End: 2021-08-06
Admitting: PHYSICIAN ASSISTANT

## 2021-08-06 VITALS
WEIGHT: 219 LBS | SYSTOLIC BLOOD PRESSURE: 152 MMHG | HEIGHT: 66 IN | HEART RATE: 57 BPM | DIASTOLIC BLOOD PRESSURE: 96 MMHG | BODY MASS INDEX: 35.2 KG/M2

## 2021-08-06 DIAGNOSIS — R07.89 CHEST PAIN, ATYPICAL: ICD-10-CM

## 2021-08-06 LAB
BH CV REST NUCLEAR ISOTOPE DOSE: 9.9 MCI
BH CV STRESS BP STAGE 2: NORMAL
BH CV STRESS BP STAGE 4: NORMAL
BH CV STRESS COMMENTS STAGE 1: NORMAL
BH CV STRESS DOSE REGADENOSON STAGE 1: 0.4
BH CV STRESS DURATION MIN STAGE 1: 1
BH CV STRESS DURATION MIN STAGE 2: 1
BH CV STRESS DURATION MIN STAGE 3: 1
BH CV STRESS DURATION MIN STAGE 4: 1
BH CV STRESS DURATION SEC STAGE 1: 0
BH CV STRESS DURATION SEC STAGE 2: 0
BH CV STRESS DURATION SEC STAGE 3: 0
BH CV STRESS DURATION SEC STAGE 4: 0
BH CV STRESS HR STAGE 1: 77
BH CV STRESS HR STAGE 2: 100
BH CV STRESS HR STAGE 3: 84
BH CV STRESS HR STAGE 4: 82
BH CV STRESS NUCLEAR ISOTOPE DOSE: 31.3 MCI
BH CV STRESS O2 STAGE 1: 95
BH CV STRESS O2 STAGE 2: 98
BH CV STRESS O2 STAGE 3: 99
BH CV STRESS O2 STAGE 4: 97
BH CV STRESS PROTOCOL 1: NORMAL
BH CV STRESS RECOVERY BP: NORMAL MMHG
BH CV STRESS RECOVERY HR: 74 BPM
BH CV STRESS RECOVERY O2: 97 %
BH CV STRESS STAGE 1: 1
BH CV STRESS STAGE 2: 2
BH CV STRESS STAGE 3: 3
BH CV STRESS STAGE 4: 4
LV EF NUC BP: 82 %
MAXIMAL PREDICTED HEART RATE: 159 BPM
PERCENT MAX PREDICTED HR: 62.89 %
STRESS BASELINE BP: NORMAL MMHG
STRESS BASELINE HR: 59 BPM
STRESS O2 SAT REST: 98 %
STRESS PERCENT HR: 74 %
STRESS POST ESTIMATED WORKLOAD: 1 METS
STRESS POST EXERCISE DUR MIN: 4 MIN
STRESS POST EXERCISE DUR SEC: 0 SEC
STRESS POST O2 SAT PEAK: 99 %
STRESS POST PEAK BP: NORMAL MMHG
STRESS POST PEAK HR: 100 BPM
STRESS TARGET HR: 135 BPM

## 2021-08-06 PROCEDURE — 93018 CV STRESS TEST I&R ONLY: CPT | Performed by: INTERNAL MEDICINE

## 2021-08-06 PROCEDURE — A9500 TC99M SESTAMIBI: HCPCS | Performed by: PHYSICIAN ASSISTANT

## 2021-08-06 PROCEDURE — 0 TECHNETIUM SESTAMIBI: Performed by: PHYSICIAN ASSISTANT

## 2021-08-06 PROCEDURE — 93017 CV STRESS TEST TRACING ONLY: CPT

## 2021-08-06 PROCEDURE — 78452 HT MUSCLE IMAGE SPECT MULT: CPT

## 2021-08-06 PROCEDURE — 78452 HT MUSCLE IMAGE SPECT MULT: CPT | Performed by: INTERNAL MEDICINE

## 2021-08-06 PROCEDURE — 25010000002 REGADENOSON 0.4 MG/5ML SOLUTION: Performed by: PHYSICIAN ASSISTANT

## 2021-08-06 RX ADMIN — REGADENOSON 0.4 MG: 0.08 INJECTION, SOLUTION INTRAVENOUS at 12:55

## 2021-08-06 RX ADMIN — TECHNETIUM TC 99M SESTAMIBI 1 DOSE: 1 INJECTION INTRAVENOUS at 12:55

## 2021-08-06 RX ADMIN — TECHNETIUM TC 99M SESTAMIBI 1 DOSE: 1 INJECTION INTRAVENOUS at 10:50

## 2021-08-09 ENCOUNTER — OFFICE VISIT (OUTPATIENT)
Dept: CARDIOLOGY | Facility: CLINIC | Age: 61
End: 2021-08-09

## 2021-08-09 VITALS
SYSTOLIC BLOOD PRESSURE: 122 MMHG | WEIGHT: 219 LBS | HEART RATE: 76 BPM | DIASTOLIC BLOOD PRESSURE: 80 MMHG | HEIGHT: 66 IN | OXYGEN SATURATION: 98 % | BODY MASS INDEX: 35.2 KG/M2

## 2021-08-09 DIAGNOSIS — I47.1 SVT (SUPRAVENTRICULAR TACHYCARDIA) (HCC): ICD-10-CM

## 2021-08-09 DIAGNOSIS — I47.1 ATRIAL TACHYCARDIA (HCC): Primary | ICD-10-CM

## 2021-08-09 PROCEDURE — 99243 OFF/OP CNSLTJ NEW/EST LOW 30: CPT | Performed by: PHYSICIAN ASSISTANT

## 2021-08-09 NOTE — PROGRESS NOTES
[facility] at [facility]   OFFICE NOTE      Dede Jerez  1960  PCP: Quinn Gonzales MD    SUBJECTIVE:   Dede Jerez is a 60 y.o. female seen for a follow up visit regarding the following:     CC:SVT    HPI:   Since we last saw the pt, pt denies any sustained palpitations episodes, SOB, CP, LH, and dizziness. Denies any hospitalizations, ER visits, bleeding, or TIA/CVA symptoms. Overall feels well.She went to [place] and had a great times. She states she is no longer having any chest pain. She completed her Stress test revealing Angina.    Cardiac PMH: (Old records have been reviewed and summarized below)  1. SVT              A. Echocardiogram 2/8/17: EF 65%, mild TR              B. Event Monitor 1/27/17-2/20/17: runs of SVT at 150 bpm              C. EPS with RFA of AT x 3 11/19/17  2. Chest pain-atypical for angina, Negative  ER work up 4/2021  3. Anxiety  4. Tobacco Abuse Quit 2 years ago   5. GERD  6. Arthritis    Past Medical History, Past Surgical History, Family history, Social History, and Medications were all reviewed with the patient today and updated as necessary.       Current Outpatient Medications:   •  venlafaxine XR (Effexor XR) 75 MG 24 hr capsule, Take 1 capsule by mouth Daily., Disp: 90 capsule, Rfl: 1      Allergies   Allergen Reactions   • Biaxin [Clarithromycin] Confusion     TABS     Patient Active Problem List   Diagnosis   • Arthritis   • MICHAEL (generalized anxiety disorder)   • Tobacco abuse   • Heart palpitations   • SVT (supraventricular tachycardia) (CMS/HCC)   • Gastroesophageal reflux disease without esophagitis   • Fatigue   • Atrial tachycardia (CMS/HCC)     Past Medical History:   Diagnosis Date   • Acid reflux    • Anxiety     panic attack related throat constricting making swallowing hard at times    • Arrhythmia    • Cataract     bilat - mild    • Constipation     in past with dieting but fine now    • High cholesterol    • Measles    •  Menopause    • Stroke (CMS/HCC)    • TIA (transient ischemic attack) 2010   • Wears glasses    • Wears partial dentures     bottom     Past Surgical History:   Procedure Laterality Date   • CARDIAC ELECTROPHYSIOLOGY PROCEDURE N/A 11/9/2017    Procedure: Ablation SVT, Hold Metoprolol for 5 days prior;  Surgeon: Manny Grimes MD;  Location: [facility] INVASIVE LOCATION;  Service:    • CARPAL TUNNEL RELEASE Bilateral    • COLONOSCOPY     • TUBAL ABDOMINAL LIGATION  1980     Family History   Problem Relation Age of Onset   • Heart attack Mother    • Coronary artery disease Mother    • Diabetes Mother    • Hypertension Mother    • Hypertension Father    • Hypertension Sister    • Diabetes Sister    • Hypertension Brother    • Diabetes Brother    • Hypertension Maternal Grandmother    • Hypertension Maternal Grandfather    • No Known Problems Paternal Grandmother    • No Known Problems Paternal Grandfather      Social History     Tobacco Use   • Smoking status: Former Smoker     Packs/day: 1.00     Years: 25.00     Pack years: 25.00     Types: Cigarettes     Quit date: 2018     Years since quitting: 3.6   • Smokeless tobacco: Never Used   • Tobacco comment: has not smoked in 3 months   Substance Use Topics   • Alcohol use: No       ROS:  Review of Symptoms:  General: no recent weight loss/gain, weakness or fatigue  Skin: no rashes, lumps, or other skin changes  HEENT: no dizziness, lightheadedness, or vision changes  Respiratory: no cough or hemoptysis  Cardiovascular: no palpitations, and tachycardia  Gastrointestinal: no black/tarry stools or diarrhea  Urinary: no change in frequency or urgency  Peripheral Vascular: no claudication or leg cramps  Musculoskeletal: no muscle or joint pain/stiffness  Psychiatric: no depression or excessive stress  Neurological: no sensory or motor loss, no syncope  Hematologic: no anemia, easy bruising or bleeding  Endocrine: no thyroid problems, nor heat or cold intolerance    PHYSICAL  "EXAM:    /80 (BP Location: Left arm, Patient Position: Sitting)   Pulse 76   Ht 167.6 cm (66\")   Wt 99.3 kg (219 lb)   LMP  (LMP Unknown)   SpO2 98%   BMI 35.35 kg/m²        Wt Readings from Last 5 Encounters:   08/09/21 99.3 kg (219 lb)   08/06/21 99.3 kg (219 lb)   06/23/21 99.3 kg (219 lb)   04/05/21 96.4 kg (212 lb 9.6 oz)   10/06/20 90.7 kg (200 lb)       BP Readings from Last 5 Encounters:   08/09/21 122/80   08/06/21 152/96   06/23/21 118/72   04/05/21 122/68   10/06/20 122/84       General appearance - Alert, well appearing, and in no distress   Mental status - Affect appropriate to mood.  Eyes - Sclerae anicteric,  ENMT - Hearing grossly normal bilaterally, Dental hygiene good.  Neck - Carotids upstroke normal bilaterally, no bruits, no JVD.  Resp - Clear to auscultation, no wheezes, rales or rhonchi, symmetric air entry.  Heart - Normal rate, regular rhythm, normal S1, S2, no murmurs, rubs, clicks or gallops.  GI - Soft, nontender, nondistended, no masses or organomegaly.  Neurological - Grossly intact - normal speech, no focal findings  Musculoskeletal - No joint tenderness, deformity or swelling, no muscular tenderness noted.  Extremities - Peripheral pulses normal, no pedal edema, no clubbing or cyanosis.  Skin - Normal coloration and turgor.  Psych -  oriented to person, place, and time.    Medical problems and test results were reviewed with the patient today.     Recent Results (from the past 672 hour(s))   COVID-19, [facility]TIM[facility] PANTHER [facility] IN-HOUSE NP/OP SWAB IN UTM/VTM/SALINE TRANSPORT MEDIA 24HR TAT - Swab, Nasopharynx    Collection Time: 08/03/21 10:20 AM    Specimen: Nasopharynx; Swab   Result Value Ref Range    COVID19 Not Detected Not Detected - Ref. Range   Stress Test With Myocardial Perfusion One Day    Collection Time: 08/06/21 12:48 PM   Result Value Ref Range    Target HR (85%) 135 bpm    Max. Pred. HR (100%) 159 bpm    BH CV REST NUCLEAR ISOTOPE DOSE 9.9 mCi    BH CV STRESS " PROTOCOL 1 Pharmacologic     Stage 1 1     Duration Min Stage 1 1     Duration Sec Stage 1 0     Stress Dose Regadenoson Stage 1 0.4     Stress Comments Stage 1 10 sec bolus injection     Stage 2 2     Duration Min Stage 2 1     Duration Sec Stage 2 0     Stage 3 3     Duration Min Stage 3 1     Duration Sec Stage 3 0     Stage 4 4     Duration Min Stage 4 1     Duration Sec Stage 4 0     Baseline HR 59 bpm    Baseline /96 mmHg    O2 sat rest 98 %    HR Stage 1 77     O2 Stage 1 95     HR Stage 2 100     BP Stage 2 144/88     O2 Stage 2 98     HR Stage 3 84     O2 Stage 3 99     HR Stage 4 82     BP Stage 4 124/80     O2 Stage 4 97     Peak  bpm    Percent Max Pred HR 62.89 %    Percent Target HR 74 %    Peak /88 mmHg    O2 sat peak 99 %    Recovery HR 74 bpm    Recovery /82 mmHg    Recovery O2 97 %    Exercise duration (min) 4 min    Exercise duration (sec) 0 sec    Estimated workload 1.0 METS     CV STRESS NUCLEAR ISOTOPE DOSE 31.3 mCi    Nuc Stress EF 82 %           ASSESSMENT   1. SVT: EPS and RFA of Atrial tachycardia 2017  2. Atypical chest pain. Negative stress MPS for ischemia.     PLAN  · Continue current medical therapy  · Follow up one year or sooner as needed.     8/9/2021  11:56 EDT  Will Jourdan AZUL

## 2021-08-17 ENCOUNTER — OFFICE VISIT (OUTPATIENT)
Dept: ORTHOPEDIC SURGERY | Facility: CLINIC | Age: 61
End: 2021-08-17

## 2021-08-17 VITALS
DIASTOLIC BLOOD PRESSURE: 87 MMHG | SYSTOLIC BLOOD PRESSURE: 137 MMHG | HEART RATE: 65 BPM | HEIGHT: 66 IN | WEIGHT: 219 LBS | BODY MASS INDEX: 35.2 KG/M2

## 2021-08-17 DIAGNOSIS — S83.241D TEAR OF MEDIAL MENISCUS OF RIGHT KNEE, CURRENT, UNSPECIFIED TEAR TYPE, SUBSEQUENT ENCOUNTER: ICD-10-CM

## 2021-08-17 DIAGNOSIS — M25.561 RIGHT KNEE PAIN, UNSPECIFIED CHRONICITY: Primary | ICD-10-CM

## 2021-08-17 DIAGNOSIS — M17.11 PRIMARY OSTEOARTHRITIS OF RIGHT KNEE: ICD-10-CM

## 2021-08-17 PROCEDURE — 99214 OFFICE O/P EST MOD 30 MIN: CPT | Performed by: ORTHOPAEDIC SURGERY

## 2021-08-17 NOTE — PROGRESS NOTES
[facility] [place] [facility] Note        Subjective     Pain of the Right Knee      HPI    Dede Jerez is a 60 y.o. female who presents with new problem of: right knee pain.  Onset: atraumatic and gradual in nature. The issue has been ongoing for 2 year(s). Pain is a 7/10 on the pain scale. Pain is described as stabbing, shooting and grinding. Associated symptoms include pain, swelling, stiffness and giving way/buckling. The pain is worse with walking, standing and sitting; ice and pain medication and/or NSAID improve the pain. Previous treatments have included: NSAIDS and steroid injection (last injection 2019).    I have reviewed the following portions of the patient's history:History of Present Illness and review of systems.         Patient is here today for new problem today with her right knee. 2 years ago she was attaching a car seat and felt a pop in the medial aspect of her right knee. Since that time, things have gradually worsened. She has swelling near around the middle of each day. Patient has difficulty driving.    Past Medical History:   Diagnosis Date   • Acid reflux    • Anxiety     panic attack related throat constricting making swallowing hard at times    • Arrhythmia    • Cataract     bilat - mild    • Constipation     in past with dieting but fine now    • High cholesterol    • Measles    • Menopause    • Stroke (CMS/HCC)    • TIA (transient ischemic attack) 2010   • Wears glasses    • Wears partial dentures     bottom      Past Surgical History:   Procedure Laterality Date   • CARDIAC ELECTROPHYSIOLOGY PROCEDURE N/A 11/9/2017    Procedure: Ablation SVT, Hold Metoprolol for 5 days prior;  Surgeon: Manny Grimes MD;  Location: [facility] INVASIVE LOCATION;  Service:    • CARPAL TUNNEL RELEASE Bilateral    • COLONOSCOPY     • TUBAL ABDOMINAL LIGATION  1980      Family History   Problem Relation Age of Onset   • Heart attack Mother    • Coronary artery disease Mother    • Diabetes  "Mother    • Hypertension Mother    • Hypertension Father    • Hypertension Sister    • Diabetes Sister    • Hypertension Brother    • Diabetes Brother    • Hypertension Maternal Grandmother    • Hypertension Maternal Grandfather    • No Known Problems Paternal Grandmother    • No Known Problems Paternal Grandfather      Social History     Socioeconomic History   • Marital status:      Spouse name: Not on file   • Number of children: 2   • Years of education: Not on file   • Highest education level: Not on file   Tobacco Use   • Smoking status: Former Smoker     Packs/day: 1.00     Years: 25.00     Pack years: 25.00     Types: Cigarettes     Quit date: 2018     Years since quitting: 3.6   • Smokeless tobacco: Never Used   • Tobacco comment: has not smoked in 3 months   Substance and Sexual Activity   • Alcohol use: No   • Drug use: No   • Sexual activity: Defer      Current Outpatient Medications on File Prior to Visit   Medication Sig Dispense Refill   • [DISCONTINUED] venlafaxine XR (Effexor XR) 75 MG 24 hr capsule Take 1 capsule by mouth Daily. 90 capsule 1     No current facility-administered medications on file prior to visit.      Allergies   Allergen Reactions   • Biaxin [Clarithromycin] Confusion     TABS          Review of Systems   Constitutional: Negative.    HENT: Negative.    Eyes: Negative.    Respiratory: Negative.    Cardiovascular: Negative.    Gastrointestinal: Negative.    Endocrine: Negative.    Genitourinary: Negative.    Musculoskeletal: Positive for arthralgias.   Skin: Negative.    Allergic/Immunologic: Negative.    Neurological: Negative.    Hematological: Negative.    Psychiatric/Behavioral: Negative.         I reviewed the patient's chief complaint, history of present illness, review of systems, past medical history, surgical history, family history, social history, medications and allergy list.        Objective      Physical Exam  /87   Pulse 65   Ht 167.6 cm (65.98\")   Wt " 99.3 kg (219 lb)   LMP  (LMP Unknown)   BMI 35.36 kg/m²     Body mass index is 35.36 kg/m².    General  Mental Status - alert  General Appearance - cooperative, well groomed, not in acute distress  Orientation - Oriented X3  Build & Nutrition - well developed and well nourished  Posture - normal posture  Gait - normal gait       Ortho Exam      Musculoskeletal:  Global Assessment:  Overall assessment of Lower Extremity Muscle Strength and Tone:    Right quadriceps--5/5  Right hamstrings--5/5  Right tibialis anterior--5/5  Right gastroc soleus--5/5  Right EHL--5/5    Lower Extremity:    Right knee:  Tenderness:  Over medial joint line  Effusion:  1+  Crepitus:  none  Pulses:  2+  Ecchymosis:  None  Warmth:  None     ROM:  Right:  Extension:0    Flexion:130    Instability:      Right:  Lachman Test:  Negative  Varus stress test negative  Valgus stress test negative   Anterior Drawer Test:  Negative  Posterior Drawer Test:  Negative    Functional Testing:  Right:  Merced's test:  Positive  Patella grind test:  Negative  Q-angle:  Normal  Apprehension Sign:  Negative      Imaging/Studies  Imaging Results (Last 24 Hours)     ** No results found for the last 24 hours. **        We have reviewed images and report of an MRI the patient's right knee from 7/9/2021 done at [facility]. Patient has patellofemoral arthritis. She also has a significant horizontal tear of the medial meniscus with a likely under full component. There is linear signal in the lateral meniscus as well with no clear entry into the joint.    Assessment    Assessment:  1. Right knee pain, unspecified chronicity    2. Tear of medial meniscus of right knee, current, unspecified tear type, subsequent encounter        Plan:  1. Continue over-the-counter medication as needed for discomfort  2. Right knee medial meniscal tear with possible lateral meniscal tear as well in the face of patellofemoral arthritis--patient symptoms and injury are  consistent with a symptomatic medial meniscal tear. We discussed treatment options and alternatives going forward. The risk, benefits, potential hazards, typical recovery rehab as well as reason alternatives to right knee arthroscopy with partial medial meniscectomy and possible partial lateral meniscectomy were discussed with her. She had up to ask questions and wished to proceed with scheduling. We will get this done as an outpatient in the near future.        Frank Beatty MD  08/17/21  12:33 EDT    Dragon disclaimer:  Much of this encounter note is an electronic transcription/translation of spoken language to printed text. The electronic translation of spoken language may permit erroneous, or at times, nonsensical words or phrases to be inadvertently transcribed; Although I have reviewed the note for such errors, some may still exist.

## 2021-09-14 ENCOUNTER — APPOINTMENT (OUTPATIENT)
Dept: PREADMISSION TESTING | Facility: HOSPITAL | Age: 61
End: 2021-09-14

## 2021-09-14 LAB — SARS-COV-2 RNA PNL SPEC NAA+PROBE: NOT DETECTED

## 2021-09-14 PROCEDURE — U0004 COV-19 TEST NON-CDC HGH THRU: HCPCS

## 2021-09-14 PROCEDURE — C9803 HOPD COVID-19 SPEC COLLECT: HCPCS

## 2021-09-17 ENCOUNTER — OUTSIDE FACILITY SERVICE (OUTPATIENT)
Dept: ORTHOPEDIC SURGERY | Facility: CLINIC | Age: 61
End: 2021-09-17

## 2021-09-17 DIAGNOSIS — Z98.890 S/P ARTHROSCOPIC KNEE SURGERY: Primary | ICD-10-CM

## 2021-09-17 PROCEDURE — 29880 ARTHRS KNE SRG MNISECTMY M&L: CPT | Performed by: ORTHOPAEDIC SURGERY

## 2021-09-17 RX ORDER — ACETAMINOPHEN AND CODEINE PHOSPHATE 300; 30 MG/1; MG/1
1 TABLET ORAL EVERY 6 HOURS PRN
Qty: 30 TABLET | Refills: 0 | Status: SHIPPED | OUTPATIENT
Start: 2021-09-17 | End: 2021-10-04

## 2021-09-20 ENCOUNTER — TELEPHONE (OUTPATIENT)
Dept: ORTHOPEDIC SURGERY | Facility: CLINIC | Age: 61
End: 2021-09-20

## 2021-09-20 NOTE — TELEPHONE ENCOUNTER
"Spoke with patient, she complains of knee buckling this am with instability. Patient was told to weight bear as tolerated following Meniscectomy performed Friday 9/17/21. She is having minimal pain and using very little of the Tylenol #3 Rx after surgery, and taking Ibuprofen as needed.    She admits to possibly \"overdoing it\" over the weekend using crutches minimally to ambulate, but is now very concerned this am due to acute instability in operative knee.     Please advise.  "

## 2021-09-20 NOTE — TELEPHONE ENCOUNTER
PATIENT CALLED AND STATES HER KNEE SHE HAD SURGERY ON Friday IS NOW BUCKLING AND TWIST FROM THE BACK OF IT.  IS UNABLE TO WALK DUE TO KNEE UNABLE TO HOLD WEIGHT. PATIENT CAN BE REACHED @ [phone]

## 2021-09-20 NOTE — TELEPHONE ENCOUNTER
This is likely some quad weakness from the tourniquet.  Not uncommon.  Needs to use crutches until she has her strength back    Domenico hunt

## 2021-09-20 NOTE — TELEPHONE ENCOUNTER
Spoke with patient she verbalized understanding of directions and will use crutches until she regains her strength.

## 2021-10-04 ENCOUNTER — OFFICE VISIT (OUTPATIENT)
Dept: FAMILY MEDICINE CLINIC | Facility: CLINIC | Age: 61
End: 2021-10-04

## 2021-10-04 VITALS
HEIGHT: 66 IN | TEMPERATURE: 98.5 F | RESPIRATION RATE: 18 BRPM | BODY MASS INDEX: 36.32 KG/M2 | SYSTOLIC BLOOD PRESSURE: 130 MMHG | DIASTOLIC BLOOD PRESSURE: 82 MMHG | WEIGHT: 226 LBS | HEART RATE: 76 BPM

## 2021-10-04 DIAGNOSIS — Z87.891 FORMER SMOKER: ICD-10-CM

## 2021-10-04 DIAGNOSIS — Z00.00 WELL WOMAN EXAM (NO GYNECOLOGICAL EXAM): Primary | ICD-10-CM

## 2021-10-04 DIAGNOSIS — Z83.42 FAMILY HISTORY OF HYPERCHOLESTEROLEMIA: ICD-10-CM

## 2021-10-04 PROCEDURE — 99396 PREV VISIT EST AGE 40-64: CPT | Performed by: FAMILY MEDICINE

## 2021-10-04 NOTE — PROGRESS NOTES
"Subjective     No chief complaint on file.      Dede Jerez is a 61 y.o. female who presents for an annual exam. The patient has no complaints today. The patient is sexually active. GYN screening history: last pap: approximate date 2020 and was normal. The patient wears seatbelts: yes.  The patient reports that there is not domestic violence in her life.     History of abnormal Pap smear: no  Family history of uterine or ovarian cancer: no  History of abnormal mammogram: no  Family history of breast cancer: no  Colonoscopy history:   Done this August at [facility]    Menstrual History:  OB History        2    Para   2    Term                AB        Living           SAB        TAB        Ectopic        Molar        Multiple        Live Births                     No LMP recorded (lmp unknown). Patient is postmenopausal.         The following portions of the patient's history were reviewed and updated as appropriate:vital signs, allergies, current medications, past family history, past medical history, past social history, past surgical history and problem list    Review of Systems   A comprehensive review of systems was negative.     Objective     /82   Pulse 76   Temp 98.5 °F (36.9 °C)   Resp 18   Ht 167.6 cm (65.98\")   Wt 103 kg (226 lb)   LMP  (LMP Unknown)   BMI 36.50 kg/m²       Physical Exam  Vitals and nursing note reviewed.   Constitutional:       General: She is not in acute distress.     Appearance: Normal appearance. She is well-developed.   HENT:      Head: Normocephalic and atraumatic.      Right Ear: Tympanic membrane, ear canal and external ear normal.      Left Ear: Tympanic membrane, ear canal and external ear normal.      Nose: Nose normal.   Eyes:      Extraocular Movements: Extraocular movements intact.      Conjunctiva/sclera: Conjunctivae normal.   Neck:      Thyroid: No thyromegaly.   Cardiovascular:      Rate and Rhythm: Normal rate and regular rhythm.      Heart " sounds: Normal heart sounds. No murmur heard.     Pulmonary:      Effort: Pulmonary effort is normal. No respiratory distress.      Breath sounds: Normal breath sounds.   Abdominal:      General: Bowel sounds are normal. There is no distension.      Palpations: Abdomen is soft.      Tenderness: There is no abdominal tenderness.   Musculoskeletal:      Cervical back: Normal range of motion and neck supple.      Comments: Incisions bandaged on right knee still.  Mildly stiff gait   Lymphadenopathy:      Cervical: No cervical adenopathy.   Skin:     General: Skin is warm and dry.   Neurological:      Mental Status: She is alert and oriented to person, place, and time.   Psychiatric:         Mood and Affect: Mood normal.         Behavior: Behavior normal.         Thought Content: Thought content normal.         Judgment: Judgment normal.           Assessment/Plan     ASSESSMENT  Healthy female exam.    1. Well woman exam (no gynecological exam)    2. Family history of hypercholesterolemia    3. Former smoker         PLAN  1.   Orders Placed This Encounter   Procedures   •  CT Chest Low Dose Cancer Screening WO   • Comprehensive Metabolic Panel   • Lipid Panel   • CBC & Differential       2. Medications prescribed this encounter:    No orders of the defined types were placed in this encounter.      3. Counseled about diet and exercise and she will exercise more once her knee has improved.  Will check low dose CT of lungs  Will check labs due to family history.  Pt agrees        Quinn Gonzales MD  10/4/2021

## 2021-10-05 ENCOUNTER — OFFICE VISIT (OUTPATIENT)
Dept: ORTHOPEDIC SURGERY | Facility: CLINIC | Age: 61
End: 2021-10-05

## 2021-10-05 VITALS — TEMPERATURE: 97.3 F

## 2021-10-05 DIAGNOSIS — S83.241D TEAR OF MEDIAL MENISCUS OF RIGHT KNEE, CURRENT, UNSPECIFIED TEAR TYPE, SUBSEQUENT ENCOUNTER: ICD-10-CM

## 2021-10-05 DIAGNOSIS — M17.11 PRIMARY OSTEOARTHRITIS OF RIGHT KNEE: ICD-10-CM

## 2021-10-05 DIAGNOSIS — S83.281D TEAR OF LATERAL MENISCUS OF RIGHT KNEE, CURRENT, UNSPECIFIED TEAR TYPE, SUBSEQUENT ENCOUNTER: ICD-10-CM

## 2021-10-05 DIAGNOSIS — Z98.890 S/P ARTHROSCOPIC KNEE SURGERY: Primary | ICD-10-CM

## 2021-10-05 LAB
ALBUMIN SERPL-MCNC: 4.8 G/DL (ref 3.5–5.2)
ALBUMIN/GLOB SERPL: 2.7 G/DL
ALP SERPL-CCNC: 76 U/L (ref 39–117)
ALT SERPL-CCNC: 12 U/L (ref 1–33)
AST SERPL-CCNC: 20 U/L (ref 1–32)
BASOPHILS # BLD AUTO: 0.04 10*3/MM3 (ref 0–0.2)
BASOPHILS NFR BLD AUTO: 0.6 % (ref 0–1.5)
BILIRUB SERPL-MCNC: 0.3 MG/DL (ref 0–1.2)
BUN SERPL-MCNC: 11 MG/DL (ref 8–23)
BUN/CREAT SERPL: 19 (ref 7–25)
CALCIUM SERPL-MCNC: 9.4 MG/DL (ref 8.6–10.5)
CHLORIDE SERPL-SCNC: 105 MMOL/L (ref 98–107)
CHOLEST SERPL-MCNC: 199 MG/DL (ref 0–200)
CO2 SERPL-SCNC: 27.7 MMOL/L (ref 22–29)
CREAT SERPL-MCNC: 0.58 MG/DL (ref 0.57–1)
EOSINOPHIL # BLD AUTO: 0.15 10*3/MM3 (ref 0–0.4)
EOSINOPHIL NFR BLD AUTO: 2.2 % (ref 0.3–6.2)
ERYTHROCYTE [DISTWIDTH] IN BLOOD BY AUTOMATED COUNT: 12.9 % (ref 12.3–15.4)
GLOBULIN SER CALC-MCNC: 1.8 GM/DL
GLUCOSE SERPL-MCNC: 83 MG/DL (ref 65–99)
HCT VFR BLD AUTO: 39.5 % (ref 34–46.6)
HDLC SERPL-MCNC: 77 MG/DL (ref 40–60)
HGB BLD-MCNC: 13.2 G/DL (ref 12–15.9)
IMM GRANULOCYTES # BLD AUTO: 0.01 10*3/MM3 (ref 0–0.05)
IMM GRANULOCYTES NFR BLD AUTO: 0.1 % (ref 0–0.5)
LDLC SERPL CALC-MCNC: 107 MG/DL (ref 0–100)
LYMPHOCYTES # BLD AUTO: 2.01 10*3/MM3 (ref 0.7–3.1)
LYMPHOCYTES NFR BLD AUTO: 29.6 % (ref 19.6–45.3)
MCH RBC QN AUTO: 30.1 PG (ref 26.6–33)
MCHC RBC AUTO-ENTMCNC: 33.4 G/DL (ref 31.5–35.7)
MCV RBC AUTO: 90.2 FL (ref 79–97)
MONOCYTES # BLD AUTO: 0.62 10*3/MM3 (ref 0.1–0.9)
MONOCYTES NFR BLD AUTO: 9.1 % (ref 5–12)
NEUTROPHILS # BLD AUTO: 3.97 10*3/MM3 (ref 1.7–7)
NEUTROPHILS NFR BLD AUTO: 58.4 % (ref 42.7–76)
PLATELET # BLD AUTO: 256 10*3/MM3 (ref 140–450)
POTASSIUM SERPL-SCNC: 4.9 MMOL/L (ref 3.5–5.2)
PROT SERPL-MCNC: 6.6 G/DL (ref 6–8.5)
RBC # BLD AUTO: 4.38 10*6/MM3 (ref 3.77–5.28)
SODIUM SERPL-SCNC: 142 MMOL/L (ref 136–145)
TRIGL SERPL-MCNC: 84 MG/DL (ref 0–150)
VLDLC SERPL CALC-MCNC: 15 MG/DL (ref 5–40)
WBC # BLD AUTO: 6.8 10*3/MM3 (ref 3.4–10.8)

## 2021-10-05 PROCEDURE — 99024 POSTOP FOLLOW-UP VISIT: CPT | Performed by: PHYSICIAN ASSISTANT

## 2021-10-05 NOTE — PROGRESS NOTES
[facility] Orthopaedic Surgery Clinic Note        Subjective     Post-op (Right knee arthroscopy with partial medial and lateral meniscectomies 9/17/21)       BHAVIN Jerez is a 61 y.o. female. Patient presents for their initial postop visit following right knee arthroscopy with medial and lateral partial meniscectomies performed on the above date by Dr. Beatty.  Also noted to have grade 3 to 4 degenerative changes MFC, grade 3 changes anterior MTP and grade 4 changes trochlea.    Doing well with pain scale 4/10.  Still having postoperative swelling, stiffness and giving away sensation.  No mechanical symptoms.  No numbness or tingling into the extremity.     Denies fevers, chills, night sweats or other constitutional symptoms.        Objective      Physical Exam  Temp 97.3 °F (36.3 °C)   LMP  (LMP Unknown)     There is no height or weight on file to calculate BMI.      Ortho Exam  Peripheral Vascular:    Upper Extremity:   Inspection:  Left--no cyanotic nail beds Right--no cyanotic nail beds   Bilateral:  Pink nail beds with brisk capillary refill   Palpation:  Bilateral radial pulse normal    Musculoskeletal:  Global Assessment:  Overall assessment of Lower Extremity Muscle Strength and Tone:    Right quadriceps--4+/5      Lower Extremity:  Knee:      Inspection and Palpation:      Right knee:    Effusion:  Trace to 1+  Crepitus:  Mild  Pulses:  2+  Ecchymosis:  None  Warmth:  None  Incision:  No erythema, healing appropriately with sutures in place   Calf:  Non tender    ROM:  Right:  Extension:0    Flexion:115      Imaging Reviewed:  No new imaging today.      Assessment:  1. S/P arthroscopic knee surgery    2. Tear of medial meniscus of right knee, current, unspecified tear type, subsequent encounter    3. Tear of lateral meniscus of right knee, current, unspecified tear type, subsequent encounter    4. Primary osteoarthritis of right knee        Plan:  1. Status post right knee arthroscopy with  M/L partial meniscectomies in knee with OA, stable  2. Sutures were removed today.  3. Referred to formal PT (in [place])--leaving for winter in 3 weeks.  Provided home exercises for now.  4. Recommend OTC NSAIDS/pain medication.  5. Follow up in 3 weeks, just prior to leaving for [place].  6. Questions and cencerns answered.      Cristina Estrella PA-C  10/10/21  13:01 EDT      Dragon disclaimer:  Much of this encounter note is an electronic transcription/translation of spoken language to printed text. The electronic translation of spoken language may permit erroneous, or at times, nonsensical words or phrases to be inadvertently transcribed; Although I have reviewed the note for such errors, some may still exist.

## 2021-12-20 ENCOUNTER — HOSPITAL ENCOUNTER (OUTPATIENT)
Dept: CT IMAGING | Facility: HOSPITAL | Age: 61
Discharge: HOME | End: 2021-12-20
Admitting: FAMILY MEDICINE

## 2021-12-20 DIAGNOSIS — Z87.891 FORMER SMOKER: ICD-10-CM

## 2021-12-20 PROCEDURE — 71271 CT THORAX LUNG CANCER SCR C-: CPT

## 2022-06-13 ENCOUNTER — TELEPHONE (OUTPATIENT)
Dept: FAMILY MEDICINE CLINIC | Facility: CLINIC | Age: 62
End: 2022-06-13

## 2022-06-13 NOTE — TELEPHONE ENCOUNTER
Please call,  at this point, she would need to continue over-the-counter symptomatic relief but if worsening, with any chest pain or shortness of breath, she would need to go to the ER for evaluation.    When did symptoms first start?    When did she test positive?

## 2022-06-13 NOTE — TELEPHONE ENCOUNTER
Caller: Dede Jerez    Relationship: Self    Best call back number: [phone]    What is the best time to reach you: ANY TIME     Who are you requesting to speak with (clinical staff, provider,  specific staff member): CLINICAL DEPARTMENT     Do you know the name of the person who called:     What was the call regarding: PATIENT HAS HAD 2 MODERNA VACCINES AS WELL AS BOTH BOOSTERS. SHE TESTED POSITIVE FOR COVID WITH A HOME TEST AS WELL AS [facility] IN [place]. PATIENT CURRENT SYMPTOMS ARE BODY ACHES, PRESSURE, HEADACHES, SORE THROAT, FATIGUE, CHILLS, FEVER (HIGHTEST 101.8) NAUSEA, AND VOMITING. PATIENT STATED SHE FEELS LIKE SHE HAS A SINUS HEADACHE WITH THE FLU.    PATIENT NEEDS TO KNOW WHAT SHE SHOULD DO OR TAKE. PLEASE ADVISE       Do you require a callback:YES

## 2022-08-15 ENCOUNTER — OFFICE VISIT (OUTPATIENT)
Dept: CARDIOLOGY | Facility: CLINIC | Age: 62
End: 2022-08-15

## 2022-08-15 VITALS
HEART RATE: 61 BPM | DIASTOLIC BLOOD PRESSURE: 74 MMHG | SYSTOLIC BLOOD PRESSURE: 118 MMHG | WEIGHT: 238 LBS | HEIGHT: 66 IN | OXYGEN SATURATION: 99 % | BODY MASS INDEX: 38.25 KG/M2

## 2022-08-15 DIAGNOSIS — I47.1 SVT (SUPRAVENTRICULAR TACHYCARDIA): Primary | ICD-10-CM

## 2022-08-15 DIAGNOSIS — I47.1 ATRIAL TACHYCARDIA: ICD-10-CM

## 2022-08-15 DIAGNOSIS — Z72.0 TOBACCO ABUSE: ICD-10-CM

## 2022-08-15 PROCEDURE — 99214 OFFICE O/P EST MOD 30 MIN: CPT | Performed by: PHYSICIAN ASSISTANT

## 2022-08-15 PROCEDURE — 93000 ELECTROCARDIOGRAM COMPLETE: CPT | Performed by: PHYSICIAN ASSISTANT

## 2022-08-15 RX ORDER — ASPIRIN 81 MG/1
81 TABLET ORAL EVERY 6 HOURS PRN
COMMUNITY

## 2022-08-15 NOTE — PROGRESS NOTES
[facility] at [facility]   OFFICE NOTE      Dede Jerez  1960  PCP: Quinn Gonzales MD    SUBJECTIVE:   Dede Jerez is a 61 y.o. female seen for a follow up visit regarding the following:     CC:SVT    HPI:   Since we last saw the pt, pt denies any sustained palpitations episodes, SOB, CP, LH, and dizziness. Denies any hospitalizations, ER visits, bleeding, or TIA/CVA symptoms. She recently moved and had to move a lot of boxes, do painting, and house work. She has had no chest pain with these actives.     Cardiac PMH: (Old records have been reviewed and summarized below)  1. SVT              A. Echocardiogram 2/8/17: EF 65%, mild TR              B. Event Monitor 1/27/17-2/20/17: runs of SVT at 150 bpm              C. EPS with RFA of AT x 3 11/19/17  2. Chest pain-atypical for angina, Negative  ER work up 4/2021  3. Anxiety  4. Tobacco Abuse: On going  5. GERD  6. Arthritis-Knees     Past Medical History, Past Surgical History, Family history, Social History, and Medications were all reviewed with the patient today and updated as necessary.       Current Outpatient Medications:   •  aspirin 81 MG EC tablet, Take 81 mg by mouth Every 6 (Six) Hours As Needed., Disp: , Rfl:       Allergies   Allergen Reactions   • Biaxin [Clarithromycin] Confusion     TABS     Patient Active Problem List   Diagnosis   • Arthritis   • MICHAEL (generalized anxiety disorder)   • Tobacco abuse   • Heart palpitations   • SVT (supraventricular tachycardia) (HCC)   • Gastroesophageal reflux disease without esophagitis   • Fatigue   • Atrial tachycardia (HCC)     Past Medical History:   Diagnosis Date   • Acid reflux    • Anxiety     panic attack related throat constricting making swallowing hard at times    • Arrhythmia    • Cataract     bilat - mild    • Constipation     in past with dieting but fine now    • COVID-19 06/2022   • High cholesterol    • Measles    • Menopause    • Stroke (HCC)    • TIA  "(transient ischemic attack)    • Wears glasses    • Wears partial dentures     bottom     Past Surgical History:   Procedure Laterality Date   • CARDIAC ELECTROPHYSIOLOGY PROCEDURE N/A 2017    Procedure: Ablation SVT, Hold Metoprolol for 5 days prior;  Surgeon: Manny Grimes MD;  Location: [facility] INVASIVE LOCATION;  Service:    • CARPAL TUNNEL RELEASE Bilateral    • COLONOSCOPY     • KNEE ARTHROSCOPY Right    • KNEE SURGERY Right 2021    arthroscopy w/ lat. & med. partial meniscectomies - Dr Beatty   • TUBAL ABDOMINAL LIGATION       Family History   Problem Relation Age of Onset   • Heart attack Mother    • Coronary artery disease Mother    • Diabetes Mother    • Hypertension Mother    • Hypertension Father    • Hypertension Sister    • Diabetes Sister    • Hypertension Brother    • Diabetes Brother    • Hypertension Maternal Grandmother    • Hypertension Maternal Grandfather    • No Known Problems Paternal Grandmother    • No Known Problems Paternal Grandfather      Social History     Tobacco Use   • Smoking status: Current Some Day Smoker     Packs/day: 0.25     Years: 25.00     Pack years: 6.25     Types: Cigarettes     Last attempt to quit: 2018     Years since quittin.6   • Smokeless tobacco: Never Used   • Tobacco comment: has not smoked in 3 months   Substance Use Topics   • Alcohol use: No     PHYSICAL EXAM:    /74 (BP Location: Right arm, Patient Position: Sitting, Cuff Size: Adult)   Pulse 61   Ht 167.6 cm (66\")   Wt 108 kg (238 lb)   LMP  (LMP Unknown)   SpO2 99%   BMI 38.41 kg/m²        Wt Readings from Last 5 Encounters:   08/15/22 108 kg (238 lb)   10/04/21 103 kg (226 lb)   21 99.3 kg (219 lb)   21 99.3 kg (219 lb)   21 99.3 kg (219 lb)       BP Readings from Last 5 Encounters:   08/15/22 118/74   10/04/21 130/82   21 137/87   21 122/80   21 152/96       General appearance - Alert, well appearing, and in no distress "   Mental status - Affect appropriate to mood.  Eyes - Sclerae anicteric,  ENMT - Hearing grossly normal bilaterally, Dental hygiene good.  Neck - Carotids upstroke normal bilaterally, no bruits, no JVD.  Resp - Clear to auscultation, no wheezes, rales or rhonchi, symmetric air entry.  Heart - Normal rate, regular rhythm, normal S1, S2, no murmurs, rubs, clicks or gallops.  GI - Soft, nontender, nondistended, no masses or organomegaly.  Neurological - Grossly intact - normal speech, no focal findings  Musculoskeletal - No joint tenderness, deformity or swelling, no muscular tenderness noted.  Extremities - Peripheral pulses normal, no pedal edema, no clubbing or cyanosis.  Skin - Normal coloration and turgor.  Psych -  oriented to person, place, and time.    Medical problems and test results were reviewed with the patient today.     No results found for this or any previous visit (from the past 672 hour(s)).      ECG 12 Lead    Date/Time: 8/15/2022 10:28 AM  Performed by: Toro Soliman PA  Authorized by: Toro Soliman PA   Rhythm: sinus rhythm  Rate: normal  Conduction: conduction normal  ST Segments: ST segments normal  QRS axis: normal  Other: no other findings    Clinical impression: normal ECG            ASSESSMENT   1. SVT: EPS and RFA of Atrial tachycardia 2017. No significant palpitations.   2. Atypical chest pain. Negative stress MPS for ischemia 2021  3. Tobacco abuse, ongoing: Discussed need for cessation.   4. OA, plan for right Knee  surgery, Right, Dr. Sanders. [facility]   5. Anxiety.     PLAN  · Continue current medical therapy  · Stop smoking   · Therapy for anxiety  · Follow up one year or sooner as needed.     8/15/2022  14:03 EDT  Will Jourdan AZUL

## 2022-08-26 ENCOUNTER — TELEPHONE (OUTPATIENT)
Dept: FAMILY MEDICINE CLINIC | Facility: CLINIC | Age: 62
End: 2022-08-26

## 2022-08-26 RX ORDER — ESCITALOPRAM OXALATE 10 MG/1
10 TABLET ORAL DAILY
Qty: 30 TABLET | Refills: 1 | Status: SHIPPED | OUTPATIENT
Start: 2022-08-26 | End: 2022-11-29

## 2022-08-26 NOTE — TELEPHONE ENCOUNTER
Was on lexapro 5 in past.  Will send in 10 mg daily.  Plan f/u in 4-8 weeks, pt will need to make an appointment

## 2022-08-26 NOTE — TELEPHONE ENCOUNTER
Caller: Dede Jerez Sigala    Relationship: Self    Best call back number: [phone]    What medication are you requesting: LEXPRO    What are your current symptoms: DEPRESSION    How long have you been experiencing symptoms:     Have you had these symptoms before:    [x] Yes  [] No    Have you been treated for these symptoms before:   [x] Yes  [] No    If a prescription is needed, what is your preferred pharmacy and phone number:   [facility] [place] [phone]    Additional notes:   PATIENT HAS CALLED REQUESTING A PRESCRIPTION FOR LEXPRO. PATIENT STATES SHE HAS TAKEN IN THE PAST AND IS REQUESTING AN INCREASED DOSE FROM WHAT SHE WAS PREVIOUSLY TAKING.

## 2022-11-29 ENCOUNTER — OFFICE VISIT (OUTPATIENT)
Dept: FAMILY MEDICINE CLINIC | Facility: CLINIC | Age: 62
End: 2022-11-29

## 2022-11-29 VITALS
RESPIRATION RATE: 8 BRPM | SYSTOLIC BLOOD PRESSURE: 140 MMHG | TEMPERATURE: 97.7 F | HEART RATE: 78 BPM | HEIGHT: 66 IN | DIASTOLIC BLOOD PRESSURE: 80 MMHG | BODY MASS INDEX: 38.41 KG/M2 | WEIGHT: 239 LBS

## 2022-11-29 DIAGNOSIS — E78.00 ELEVATED CHOLESTEROL: ICD-10-CM

## 2022-11-29 DIAGNOSIS — E55.9 VITAMIN D DEFICIENCY: ICD-10-CM

## 2022-11-29 DIAGNOSIS — Z00.00 WELL WOMAN EXAM (NO GYNECOLOGICAL EXAM): Primary | ICD-10-CM

## 2022-11-29 PROBLEM — R00.2 HEART PALPITATIONS: Status: RESOLVED | Noted: 2017-01-16 | Resolved: 2022-11-29

## 2022-11-29 PROBLEM — R53.83 FATIGUE: Status: RESOLVED | Noted: 2018-09-12 | Resolved: 2022-11-29

## 2022-11-29 PROCEDURE — 99396 PREV VISIT EST AGE 40-64: CPT | Performed by: FAMILY MEDICINE

## 2022-11-29 NOTE — PROGRESS NOTES
"Subjective     Chief Complaint   Patient presents with   • Annual Exam       Dede Jerez is a 62 y.o. female who presents for an annual exam. The patient has no complaints today. The patient is not sexually active. GYN screening history: last pap: approximate date  and was normal. The patient wears seatbelts: yes. The patient participates in regular exercise: trying to walk more. \    History of abnormal Pap smear: no  Family history of uterine or ovarian cancer: no  Family history of colon cancer: no  History of abnormal mammogram: no  Family history of breast cancer: no  Colonoscopy history:       Menstrual History:  OB History        2    Para   2    Term                AB        Living           SAB        IAB        Ectopic        Molar        Multiple        Live Births                     No LMP recorded (lmp unknown). Patient is postmenopausal.         The following portions of the patient's history were reviewed and updated as appropriate:vital signs, allergies, current medications, past family history, past medical history, past social history, past surgical history and problem list    Review of Systems   A comprehensive review of systems was negative.     Objective     /80   Pulse 78   Temp 97.7 °F (36.5 °C)   Resp 8   Ht 167.6 cm (66\")   Wt 108 kg (239 lb)   LMP  (LMP Unknown)   BMI 38.58 kg/m²       Physical Exam  Vitals and nursing note reviewed.   Constitutional:       General: She is not in acute distress.     Appearance: Normal appearance. She is well-developed.   HENT:      Head: Normocephalic and atraumatic.      Right Ear: Tympanic membrane, ear canal and external ear normal.      Left Ear: Tympanic membrane, ear canal and external ear normal.      Nose: Nose normal.   Eyes:      Extraocular Movements: Extraocular movements intact.      Conjunctiva/sclera: Conjunctivae normal.   Neck:      Thyroid: No thyromegaly.   Cardiovascular:      Rate and Rhythm: " Normal rate and regular rhythm.      Heart sounds: Normal heart sounds. No murmur heard.  Pulmonary:      Effort: Pulmonary effort is normal. No respiratory distress.      Breath sounds: Normal breath sounds.   Abdominal:      General: Bowel sounds are normal. There is no distension.      Palpations: Abdomen is soft.      Tenderness: There is no abdominal tenderness.   Musculoskeletal:      Cervical back: Normal range of motion and neck supple.   Lymphadenopathy:      Cervical: No cervical adenopathy.   Skin:     General: Skin is warm and dry.   Neurological:      Mental Status: She is alert and oriented to person, place, and time.   Psychiatric:         Mood and Affect: Mood normal.         Behavior: Behavior normal.         Thought Content: Thought content normal.         Judgment: Judgment normal.             Assessment & Plan     ASSESSMENT  Healthy female exam.    1. Well woman exam (no gynecological exam)    2. Vitamin D deficiency    3. Elevated cholesterol         PLAN  1.   Orders Placed This Encounter   Procedures   • Comprehensive Metabolic Panel   • Lipid Panel   • Vitamin D,25-Hydroxy   • CBC & Differential       2. Medications prescribed this encounter:    No orders of the defined types were placed in this encounter.      3. Overall pt doing well.  Has fiance she is spending time with.  She [place] in [place] and love it there!  Will check labs and f/u pending results.        Quinn Gonzales MD  11/29/2022

## 2022-11-30 LAB
25(OH)D3+25(OH)D2 SERPL-MCNC: 25.1 NG/ML (ref 30–100)
ALBUMIN SERPL-MCNC: 4.3 G/DL (ref 3.5–5.2)
ALBUMIN/GLOB SERPL: 1.7 G/DL
ALP SERPL-CCNC: 80 U/L (ref 39–117)
ALT SERPL-CCNC: 13 U/L (ref 1–33)
AST SERPL-CCNC: 18 U/L (ref 1–32)
BASOPHILS # BLD AUTO: 0.03 10*3/MM3 (ref 0–0.2)
BASOPHILS NFR BLD AUTO: 0.4 % (ref 0–1.5)
BILIRUB SERPL-MCNC: 0.3 MG/DL (ref 0–1.2)
BUN SERPL-MCNC: 12 MG/DL (ref 8–23)
BUN/CREAT SERPL: 16.9 (ref 7–25)
CALCIUM SERPL-MCNC: 9.4 MG/DL (ref 8.6–10.5)
CHLORIDE SERPL-SCNC: 102 MMOL/L (ref 98–107)
CHOLEST SERPL-MCNC: 214 MG/DL (ref 0–200)
CO2 SERPL-SCNC: 27.8 MMOL/L (ref 22–29)
CREAT SERPL-MCNC: 0.71 MG/DL (ref 0.57–1)
EGFRCR SERPLBLD CKD-EPI 2021: 95.7 ML/MIN/1.73
EOSINOPHIL # BLD AUTO: 0.1 10*3/MM3 (ref 0–0.4)
EOSINOPHIL NFR BLD AUTO: 1.2 % (ref 0.3–6.2)
ERYTHROCYTE [DISTWIDTH] IN BLOOD BY AUTOMATED COUNT: 12.9 % (ref 12.3–15.4)
GLOBULIN SER CALC-MCNC: 2.6 GM/DL
GLUCOSE SERPL-MCNC: 98 MG/DL (ref 65–99)
HCT VFR BLD AUTO: 40.3 % (ref 34–46.6)
HDLC SERPL-MCNC: 68 MG/DL (ref 40–60)
HGB BLD-MCNC: 13.7 G/DL (ref 12–15.9)
IMM GRANULOCYTES # BLD AUTO: 0.03 10*3/MM3 (ref 0–0.05)
IMM GRANULOCYTES NFR BLD AUTO: 0.4 % (ref 0–0.5)
LDLC SERPL CALC-MCNC: 120 MG/DL (ref 0–100)
LYMPHOCYTES # BLD AUTO: 2.14 10*3/MM3 (ref 0.7–3.1)
LYMPHOCYTES NFR BLD AUTO: 25.1 % (ref 19.6–45.3)
MCH RBC QN AUTO: 29.8 PG (ref 26.6–33)
MCHC RBC AUTO-ENTMCNC: 34 G/DL (ref 31.5–35.7)
MCV RBC AUTO: 87.8 FL (ref 79–97)
MONOCYTES # BLD AUTO: 0.77 10*3/MM3 (ref 0.1–0.9)
MONOCYTES NFR BLD AUTO: 9 % (ref 5–12)
NEUTROPHILS # BLD AUTO: 5.46 10*3/MM3 (ref 1.7–7)
NEUTROPHILS NFR BLD AUTO: 63.9 % (ref 42.7–76)
PLATELET # BLD AUTO: 252 10*3/MM3 (ref 140–450)
POTASSIUM SERPL-SCNC: 4.7 MMOL/L (ref 3.5–5.2)
PROT SERPL-MCNC: 6.9 G/DL (ref 6–8.5)
RBC # BLD AUTO: 4.59 10*6/MM3 (ref 3.77–5.28)
SODIUM SERPL-SCNC: 140 MMOL/L (ref 136–145)
TRIGL SERPL-MCNC: 150 MG/DL (ref 0–150)
VLDLC SERPL CALC-MCNC: 26 MG/DL (ref 5–40)
WBC # BLD AUTO: 8.53 10*3/MM3 (ref 3.4–10.8)

## 2023-08-16 ENCOUNTER — OFFICE VISIT (OUTPATIENT)
Dept: CARDIOLOGY | Facility: CLINIC | Age: 63
End: 2023-08-16
Payer: COMMERCIAL

## 2023-08-16 VITALS
SYSTOLIC BLOOD PRESSURE: 122 MMHG | BODY MASS INDEX: 37.28 KG/M2 | WEIGHT: 232 LBS | RESPIRATION RATE: 18 BRPM | DIASTOLIC BLOOD PRESSURE: 78 MMHG | HEIGHT: 66 IN | OXYGEN SATURATION: 96 % | HEART RATE: 62 BPM

## 2023-08-16 DIAGNOSIS — I47.1 SVT (SUPRAVENTRICULAR TACHYCARDIA): Primary | ICD-10-CM

## 2023-08-16 PROCEDURE — 99213 OFFICE O/P EST LOW 20 MIN: CPT | Performed by: PHYSICIAN ASSISTANT

## 2023-08-16 PROCEDURE — 93000 ELECTROCARDIOGRAM COMPLETE: CPT | Performed by: PHYSICIAN ASSISTANT

## 2023-08-16 NOTE — PROGRESS NOTES
[facility]   OFFICE NOTE      Dede Jerez  1960  PCP: Quinn Gonzales MD    SUBJECTIVE:   Dede Jerez is a 62 y.o. female seen for a follow up visit regarding the following:     CC:SVT    HPI:   Pleasant 62-year-old female returns for a 1 year follow-up regarding SVT.  She was last seen by our office August 15, 2022.  Since we last saw the pt, pt denies any sustained palpitations episodes, SOB, CP, LH, and dizziness. Denies any hospitalizations, ER visits, bleeding, or TIA/CVA symptoms.  She stays quite active doing housework power washing she has had no chest pain chest angina pectoris with this.  She has upcoming appointment with her PCP this fall.  Fortunately she continues to smoke cigarettes.  Cardiac PMH: (Old records have been reviewed and summarized below)  1. SVT              A. Echocardiogram 2/8/17: EF 65%, mild TR              B. Event Monitor 1/27/17-2/20/17: runs of SVT at 150 bpm              C. EPS with RFA of AT x 3 11/19/17  2. Chest pain-atypical for angina, Negative  ER work up 4/2021   A. Negative MPS 20021     3. Anxiety  4. Tobacco Abuse: On going  5. GERD  6. Arthritis-Knees   7.  Dyslipidemia followed by PCP  November 2022    Past Medical History, Past Surgical History, Family history, Social History, and Medications were all reviewed with the patient today and updated as necessary.       Current Outpatient Medications:     CBD (cannabidiol) oral oil, Take 1 drop by mouth Daily., Disp: , Rfl:       Allergies   Allergen Reactions    Biaxin [Clarithromycin] Confusion     TABS     Patient Active Problem List   Diagnosis    Arthritis    MICHAEL (generalized anxiety disorder)    Tobacco abuse    SVT (supraventricular tachycardia)    Gastroesophageal reflux disease without esophagitis    Atrial tachycardia    Vitamin D deficiency     Past Medical History:   Diagnosis Date    Acid reflux     Anxiety     panic attack related throat  constricting making swallowing hard at times     Arrhythmia     Cataract     bilat - mild     Constipation     in past with dieting but fine now     COVID-19 06/2022    High cholesterol     Measles     Menopause     Stroke     TIA (transient ischemic attack) 2010    Vitamin D deficiency 11/29/2022    Wears glasses     Wears partial dentures     bottom     Past Surgical History:   Procedure Laterality Date    ABLATION OF DYSRHYTHMIC FOCUS  2017    Not sure of date    CARDIAC ELECTROPHYSIOLOGY PROCEDURE N/A 11/09/2017    Procedure: Ablation SVT, Hold Metoprolol for 5 days prior;  Surgeon: Manny Grimes MD;  Location: [facility] INVASIVE LOCATION;  Service:     CARPAL TUNNEL RELEASE Bilateral     COLONOSCOPY      KNEE ARTHROSCOPY Right 2021    KNEE SURGERY Right 09/17/2021    arthroscopy w/ lat. & med. partial meniscectomies - Dr Beatty    TUBAL ABDOMINAL LIGATION  1980     Family History   Problem Relation Age of Onset    Heart attack Mother     Coronary artery disease Mother     Diabetes Mother     Hypertension Mother         Heart failure, diabetes, hypertension    Hypertension Father         Hypertension    Hypertension Sister     Diabetes Sister     Hypertension Brother     Diabetes Brother     Hypertension Maternal Grandmother     Hypertension Maternal Grandfather     No Known Problems Paternal Grandmother     No Known Problems Paternal Grandfather      Social History     Tobacco Use    Smoking status: Every Day     Packs/day: 0.50     Years: 4.00     Pack years: 2.00     Types: Cigarettes     Start date: 4/15/2022     Passive exposure: Current    Smokeless tobacco: Never    Tobacco comments:     Smoker on and off for years.  No tobacco use for four years prior to April 2022   Substance Use Topics    Alcohol use: Yes     Alcohol/week: 2.0 standard drinks     Types: 2 Cans of beer per week     Comment: 2-3 times weekly     PHYSICAL EXAM:    /78 (BP Location: Left arm, Patient Position: Sitting, Cuff  "Size: Adult)   Pulse 62   Resp 18   Ht 167.6 cm (66\")   Wt 105 kg (232 lb)   LMP  (LMP Unknown)   SpO2 96%   BMI 37.45 kg/mý        Wt Readings from Last 5 Encounters:   08/16/23 105 kg (232 lb)   11/29/22 108 kg (239 lb)   08/15/22 108 kg (238 lb)   10/04/21 103 kg (226 lb)   08/17/21 99.3 kg (219 lb)       BP Readings from Last 5 Encounters:   08/16/23 122/78   11/29/22 140/80   08/15/22 118/74   10/04/21 130/82   08/17/21 137/87       General appearance - Alert, well appearing, and in no distress   Mental status - Affect appropriate to mood.  Eyes - Sclerae anicteric,  ENMT - Hearing grossly normal bilaterally, Dental hygiene good.  Neck - Carotids upstroke normal bilaterally, no bruits, no JVD.  Resp - Clear to auscultation, no wheezes, rales or rhonchi, symmetric air entry.  Heart - Normal rate, regular rhythm, normal S1, S2, no murmurs, rubs, clicks or gallops.  GI - Soft, nontender, nondistended, no masses or organomegaly.  Neurological - Grossly intact - normal speech, no focal findings  Musculoskeletal - No joint tenderness, deformity or swelling, no muscular tenderness noted.  Extremities - Peripheral pulses normal, no pedal edema, no clubbing or cyanosis.  Skin - Normal coloration and turgor.  Psych -  oriented to person, place, and time.    Medical problems and test results were reviewed with the patient today.     No results found for this or any previous visit (from the past 672 hour(s)).      ECG 12 Lead    Date/Time: 8/16/2023 9:57 AM  Performed by: Toro Soliman PA  Authorized by: Toro Soliman PA   Comparison: compared with previous ECG from 8/15/2022  Similar to previous ECG  Rhythm: sinus rhythm  Rate: normal  Conduction: conduction normal  ST Segments: ST segments normal  T Waves: T waves normal  QRS axis: normal    Clinical impression: normal ECG      August 15, 2022    ASSESSMENT   1. SVT: EPS and RFA of Atrial tachycardia 2017. No significant palpitations.     2. Atypical " chest pain. Negative stress MPS for ischemia 2021    3. Tobacco abuse, ongoing: Discussed need for cessation.     4. HLD: .     5. Obesity: Diet exercise        PLAN  Stable CV course, again it discussed the patient needs to focus on risk factors for heart disease such as quitting smoking, diet exercise weight loss  She return follow-up or office in a year and a half or sooner as needed.      8/16/2023  09:53 EDT  Will Jourdan AZUL

## 2023-12-01 ENCOUNTER — OFFICE VISIT (OUTPATIENT)
Dept: FAMILY MEDICINE CLINIC | Facility: CLINIC | Age: 63
End: 2023-12-01
Payer: COMMERCIAL

## 2023-12-01 VITALS
BODY MASS INDEX: 36.96 KG/M2 | RESPIRATION RATE: 16 BRPM | HEIGHT: 66 IN | TEMPERATURE: 98 F | WEIGHT: 230 LBS | SYSTOLIC BLOOD PRESSURE: 118 MMHG | DIASTOLIC BLOOD PRESSURE: 70 MMHG | HEART RATE: 72 BPM

## 2023-12-01 DIAGNOSIS — E78.00 ELEVATED CHOLESTEROL: ICD-10-CM

## 2023-12-01 DIAGNOSIS — E55.9 VITAMIN D DEFICIENCY: ICD-10-CM

## 2023-12-01 DIAGNOSIS — Z00.00 WELL WOMAN EXAM (NO GYNECOLOGICAL EXAM): Primary | ICD-10-CM

## 2023-12-01 PROCEDURE — 99396 PREV VISIT EST AGE 40-64: CPT | Performed by: FAMILY MEDICINE

## 2023-12-01 RX ORDER — TRIAMCINOLONE ACETONIDE 1 MG/G
CREAM TOPICAL
COMMUNITY
Start: 2023-11-29

## 2023-12-01 NOTE — PROGRESS NOTES
"Subjective     Chief Complaint   Patient presents with    Annual Exam       Dede Jerez is a 63 y.o. female who presents for an annual exam. The patient has no complaints today. The patient is sexually active. GYN screening history: last pap: approximate date 2022 and was normal. The patient wears seatbelts: yes. The patient participates in regular exercise: no.   The patient reports that there is not domestic violence in her life.     History of abnormal Pap smear: no  Family history of uterine or ovarian cancer: no  Family history of colon cancer: no  History of abnormal mammogram: no  Family history of breast cancer: no  Colonoscopy history:         Menstrual History:  OB History          2    Para   2    Term                AB        Living             SAB        IAB        Ectopic        Molar        Multiple        Live Births                     No LMP recorded (lmp unknown). Patient is postmenopausal.         The following portions of the patient's history were reviewed and updated as appropriate:vital signs, allergies, current medications, past family history, past medical history, past social history, past surgical history, and problem list    Review of Systems   A comprehensive review of systems was negative.     Objective     /70   Pulse 72   Temp 98 °F (36.7 °C)   Resp 16   Ht 167.6 cm (66\")   Wt 104 kg (230 lb)   LMP  (LMP Unknown)   BMI 37.12 kg/m²       Physical Exam  Vitals and nursing note reviewed.   Constitutional:       General: She is not in acute distress.     Appearance: Normal appearance. She is well-developed.   HENT:      Head: Normocephalic and atraumatic.      Right Ear: Tympanic membrane, ear canal and external ear normal.      Left Ear: Tympanic membrane, ear canal and external ear normal.      Nose: Nose normal.   Eyes:      Extraocular Movements: Extraocular movements intact.      Conjunctiva/sclera: Conjunctivae normal.   Neck:      Thyroid: " No thyromegaly.   Cardiovascular:      Rate and Rhythm: Normal rate and regular rhythm.      Heart sounds: Normal heart sounds. No murmur heard.  Pulmonary:      Effort: Pulmonary effort is normal. No respiratory distress.      Breath sounds: Normal breath sounds.   Abdominal:      General: Bowel sounds are normal. There is no distension.      Palpations: Abdomen is soft.      Tenderness: There is no abdominal tenderness.   Musculoskeletal:      Cervical back: Normal range of motion and neck supple.   Lymphadenopathy:      Cervical: No cervical adenopathy.   Skin:     General: Skin is warm and dry.   Neurological:      Mental Status: She is alert and oriented to person, place, and time.   Psychiatric:         Mood and Affect: Mood normal.         Behavior: Behavior normal.         Thought Content: Thought content normal.         Judgment: Judgment normal.             Assessment & Plan     ASSESSMENT  Healthy female exam.    1. Well woman exam (no gynecological exam)    2. Elevated cholesterol    3. Vitamin D deficiency         PLAN  1.   Orders Placed This Encounter   Procedures    Comprehensive Metabolic Panel    Lipid Panel    Vitamin D,25-Hydroxy    CBC & Differential       2. Medications prescribed this encounter:    No orders of the defined types were placed in this encounter.      3. Counseled pt about well adult care including diet and exercise when BMI is 37    Recheck labs as cholesterol with mild elevation last year.  F/u pending labs    Request mammogram from [facility], completed earlier this year      Quinn Gonzales MD  12/1/2023

## 2023-12-21 ENCOUNTER — LAB (OUTPATIENT)
Dept: FAMILY MEDICINE CLINIC | Facility: CLINIC | Age: 63
End: 2023-12-21
Payer: COMMERCIAL

## 2023-12-22 LAB
25(OH)D3+25(OH)D2 SERPL-MCNC: 18.6 NG/ML (ref 30–100)
ALBUMIN SERPL-MCNC: 4.5 G/DL (ref 3.9–4.9)
ALBUMIN/GLOB SERPL: 2 {RATIO} (ref 1.2–2.2)
ALP SERPL-CCNC: 80 IU/L (ref 44–121)
ALT SERPL-CCNC: 10 IU/L (ref 0–32)
AST SERPL-CCNC: 16 IU/L (ref 0–40)
BASOPHILS # BLD AUTO: 0.1 X10E3/UL (ref 0–0.2)
BASOPHILS NFR BLD AUTO: 1 %
BILIRUB SERPL-MCNC: 0.4 MG/DL (ref 0–1.2)
BUN SERPL-MCNC: 8 MG/DL (ref 8–27)
BUN/CREAT SERPL: 11 (ref 12–28)
CALCIUM SERPL-MCNC: 9.5 MG/DL (ref 8.7–10.3)
CHLORIDE SERPL-SCNC: 103 MMOL/L (ref 96–106)
CHOLEST SERPL-MCNC: 220 MG/DL (ref 100–199)
CO2 SERPL-SCNC: 25 MMOL/L (ref 20–29)
CREAT SERPL-MCNC: 0.74 MG/DL (ref 0.57–1)
EGFRCR SERPLBLD CKD-EPI 2021: 90 ML/MIN/1.73
EOSINOPHIL # BLD AUTO: 0.1 X10E3/UL (ref 0–0.4)
EOSINOPHIL NFR BLD AUTO: 1 %
ERYTHROCYTE [DISTWIDTH] IN BLOOD BY AUTOMATED COUNT: 13.3 % (ref 11.7–15.4)
GLOBULIN SER CALC-MCNC: 2.3 G/DL (ref 1.5–4.5)
GLUCOSE SERPL-MCNC: 86 MG/DL (ref 70–99)
HCT VFR BLD AUTO: 43.1 % (ref 34–46.6)
HDLC SERPL-MCNC: 68 MG/DL
HGB BLD-MCNC: 14.2 G/DL (ref 11.1–15.9)
IMM GRANULOCYTES # BLD AUTO: 0 X10E3/UL (ref 0–0.1)
IMM GRANULOCYTES NFR BLD AUTO: 0 %
LDLC SERPL CALC-MCNC: 135 MG/DL (ref 0–99)
LYMPHOCYTES # BLD AUTO: 2.4 X10E3/UL (ref 0.7–3.1)
LYMPHOCYTES NFR BLD AUTO: 34 %
MCH RBC QN AUTO: 29.9 PG (ref 26.6–33)
MCHC RBC AUTO-ENTMCNC: 32.9 G/DL (ref 31.5–35.7)
MCV RBC AUTO: 91 FL (ref 79–97)
MONOCYTES # BLD AUTO: 0.6 X10E3/UL (ref 0.1–0.9)
MONOCYTES NFR BLD AUTO: 8 %
NEUTROPHILS # BLD AUTO: 4 X10E3/UL (ref 1.4–7)
NEUTROPHILS NFR BLD AUTO: 56 %
PLATELET # BLD AUTO: 249 X10E3/UL (ref 150–450)
POTASSIUM SERPL-SCNC: 4.7 MMOL/L (ref 3.5–5.2)
PROT SERPL-MCNC: 6.8 G/DL (ref 6–8.5)
RBC # BLD AUTO: 4.75 X10E6/UL (ref 3.77–5.28)
SODIUM SERPL-SCNC: 140 MMOL/L (ref 134–144)
TRIGL SERPL-MCNC: 99 MG/DL (ref 0–149)
VLDLC SERPL CALC-MCNC: 17 MG/DL (ref 5–40)
WBC # BLD AUTO: 7.2 X10E3/UL (ref 3.4–10.8)

## 2025-07-18 ENCOUNTER — OFFICE VISIT (OUTPATIENT)
Dept: FAMILY MEDICINE CLINIC | Facility: CLINIC | Age: 65
End: 2025-07-18
Payer: MEDICARE

## 2025-07-18 VITALS
BODY MASS INDEX: 34.87 KG/M2 | RESPIRATION RATE: 18 BRPM | WEIGHT: 217 LBS | HEIGHT: 66 IN | HEART RATE: 70 BPM | TEMPERATURE: 97.3 F | DIASTOLIC BLOOD PRESSURE: 64 MMHG | OXYGEN SATURATION: 96 % | SYSTOLIC BLOOD PRESSURE: 108 MMHG

## 2025-07-18 DIAGNOSIS — R41.3 MEMORY CHANGE: ICD-10-CM

## 2025-07-18 DIAGNOSIS — Z78.0 POST-MENOPAUSAL: ICD-10-CM

## 2025-07-18 DIAGNOSIS — E78.00 ELEVATED CHOLESTEROL: ICD-10-CM

## 2025-07-18 DIAGNOSIS — E55.9 VITAMIN D DEFICIENCY: ICD-10-CM

## 2025-07-18 DIAGNOSIS — Z00.00 MEDICARE ANNUAL WELLNESS VISIT, SUBSEQUENT: Primary | ICD-10-CM

## 2025-07-18 DIAGNOSIS — Z87.891 PERSONAL HISTORY OF TOBACCO USE, PRESENTING HAZARDS TO HEALTH: ICD-10-CM

## 2025-07-18 RX ORDER — DONEPEZIL HYDROCHLORIDE 5 MG/1
5 TABLET, FILM COATED ORAL NIGHTLY
Qty: 90 TABLET | Refills: 3 | Status: SHIPPED | OUTPATIENT
Start: 2025-07-18

## 2025-07-18 NOTE — PROGRESS NOTES
" Subjective   The ABCs of the Annual Wellness Visit  Medicare Wellness Visit      Dede Jerez is a 64 y.o. patient who presents for a Medicare Wellness Visit.    The following portions of the patient's history were reviewed and   updated as appropriate: allergies, current medications, past family history, past medical history, past social history, past surgical history, and problem list.    Compared to one year ago, the patient's physical   health is worse.  Compared to one year ago, the patient's mental   health is the same.    Recent Hospitalizations:  She was not admitted to the hospital during the last year.     Current Medical Providers:  Patient Care Team:  Quinn Gonzales MD as PCP - General Soliman, NATASHA Canseco as Physician Assistant (Cardiology)    Outpatient Medications Prior to Visit   Medication Sig Dispense Refill    CBD (cannabidiol) oral oil Take 1 drop by mouth Daily.      triamcinolone (KENALOG) 0.1 % cream        No facility-administered medications prior to visit.     No opioid medication identified on active medication list. I have reviewed chart for other potential  high risk medication/s and harmful drug interactions in the elderly.          Patient Active Problem List   Diagnosis    Arthritis    MICHAEL (generalized anxiety disorder)    Tobacco abuse    SVT (supraventricular tachycardia)    Gastroesophageal reflux disease without esophagitis    Atrial tachycardia    Vitamin D deficiency    Memory change     Advance Care Planning Advance Directive is on file.  ACP discussion was held with the patient during this visit. Patient has an advance directive in EMR which is still valid.             Objective   Vitals:    07/18/25 0946   BP: 108/64   Pulse: 70   Resp: 18   Temp: 97.3 °F (36.3 °C)   SpO2: 96%   Weight: 98.4 kg (217 lb)   Height: 167.6 cm (66\")       Estimated body mass index is 35.02 kg/m² as calculated from the following:    Height as of this encounter: 167.6 cm (66\").    Weight " as of this encounter: 98.4 kg (217 lb).               Gait and Balance Evaluation:  Normal    Does the patient have evidence of cognitive impairment? No                                                                                                Health  Risk Assessment    Smoking Status:  Social History     Tobacco Use   Smoking Status Every Day    Current packs/day: 0.50    Average packs/day: 0.5 packs/day for 4.0 years (2.0 ttl pk-yrs)    Types: Cigarettes    Start date: 4/15/2022    Passive exposure: Current   Smokeless Tobacco Never   Tobacco Comments    Smoker on and off for years.  No tobacco use for four years prior to 2022     Alcohol Consumption:  Social History     Substance and Sexual Activity   Alcohol Use Yes    Alcohol/week: 2.0 standard drinks of alcohol    Types: 2 Cans of beer per week    Comment: 2-3 times weekly       Fall Risk Screen  NANCY Fall Risk Assessment was completed, and patient is at LOW risk for falls.Assessment completed on:2025    Depression Screening   Little interest or pleasure in doing things? Not at all   Feeling down, depressed, or hopeless? Several days   PHQ-2 Total Score 1      Health Habits and Functional and Cognitive Screenin/18/2025     9:00 AM   Functional & Cognitive Status   Do you have difficulty preparing food and eating? No   Do you have difficulty bathing yourself, getting dressed or grooming yourself? No   Do you have difficulty using the toilet? No   Do you have difficulty moving around from place to place? No   Do you have trouble with steps or getting out of a bed or a chair? No   Current Diet Well Balanced Diet   Dental Exam Up to date   Eye Exam Up to date   Exercise (times per week) 7 times per week   Current Exercises Include Walking   Do you need help using the phone?  No   Are you deaf or do you have serious difficulty hearing?  No   Do you need help to go to places out of walking distance? No   Do you need help shopping? No   Do  you need help preparing meals?  No   Do you need help with housework?  No   Do you need help with laundry? No   Do you need help taking your medications? No   Do you need help managing money? No   Do you ever drive or ride in a car without wearing a seat belt? No   Have you felt unusual fatigue (could be tiredness), stress, anger or loneliness in the last month? Yes   Who do you live with? Spouse   If you need help, do you have trouble finding someone available to you? No   Have you been bothered in the last four weeks by sexual problems? No   Do you have difficulty concentrating, remembering or making decisions? Yes           Visual Acuity:  Vision Screening    Right eye Left eye Both eyes   Without correction      With correction 20/25 20/30 20/25     Age-appropriate Screening Schedule:  Refer to the list below for future screening recommendations based on patient's age, sex and/or medical conditions. Orders for these recommended tests are listed in the plan section. The patient has been provided with a written plan.    Health Maintenance List  Health Maintenance   Topic Date Due    DXA SCAN  Never done    TDAP/TD VACCINES (1 - Tdap) Never done    ANNUAL WELLNESS VISIT  Never done    COVID-19 Vaccine (5 - 2024-25 season) 09/01/2024    LIPID PANEL  12/21/2024    INFLUENZA VACCINE  10/01/2025    MAMMOGRAM  06/25/2027    COLORECTAL CANCER SCREENING  08/30/2031    HEPATITIS C SCREENING  Completed    Pneumococcal Vaccine 50+  Completed    ZOSTER VACCINE  Completed    LUNG CANCER SCREENING  Discontinued                                                                                                                                                CMS Preventative Services Quick Reference  Risk Factors Identified During Encounter  Depression/Dysphoria: discussed options, pt declined medicine  Inactivity/Sedentary: Patient was advised to exercise at least 150 minutes a week per CDC recommendations.  Tobacco Use/Dependance  "Risk    The above risks/problems have been discussed with the patient.  Pertinent information has been shared with the patient in the After Visit Summary.  An After Visit Summary and PPPS were made available to the patient.    Follow Up:   Next Medicare Wellness visit to be scheduled in 1 year.         Additional E&M Note during same encounter follows:  Patient has additional, significant, and separately identifiable condition(s)/problem(s) that require work above and beyond the Medicare Wellness Visit     Chief Complaint  Medicare Wellness-subsequent    Subjective   HPI  Dede is also being seen today for additional medical problem/s.    She has noted her memory is getting worse  Harder to remember short term things  Just little things, no major issues noted but noting that her memory is just not as sharp    She has not been taking Vit D the past few months  Has had low levels in past  Was wondering if her numbers were ok at this time without the medicine    Has not been on cholesterol medicine  Does not want to take it  Has been fasting today for recheck    Still dealing with stress, family members health  She does not feel it is bad enough to need medicine      Review of Systems   Constitutional: Negative.    Respiratory: Negative.     Cardiovascular: Negative.    Neurological:         Memory   Psychiatric/Behavioral:          Stress but not overwhelming              Objective   Vital Signs:  /64   Pulse 70   Temp 97.3 °F (36.3 °C)   Resp 18   Ht 167.6 cm (66\")   Wt 98.4 kg (217 lb)   SpO2 96%   BMI 35.02 kg/m²   Physical Exam  Vitals and nursing note reviewed.   Constitutional:       General: She is not in acute distress.     Appearance: Normal appearance. She is well-developed.   HENT:      Head: Normocephalic and atraumatic.      Right Ear: Tympanic membrane, ear canal and external ear normal.      Left Ear: Tympanic membrane, ear canal and external ear normal.      Nose: Nose normal.   Eyes:     "  Extraocular Movements: Extraocular movements intact.      Conjunctiva/sclera: Conjunctivae normal.   Neck:      Thyroid: No thyromegaly.   Cardiovascular:      Rate and Rhythm: Normal rate and regular rhythm.      Heart sounds: Normal heart sounds. No murmur heard.  Pulmonary:      Effort: Pulmonary effort is normal. No respiratory distress.      Breath sounds: Normal breath sounds.   Abdominal:      General: Bowel sounds are normal. There is no distension.      Palpations: Abdomen is soft.      Tenderness: There is no abdominal tenderness.   Musculoskeletal:      Cervical back: Normal range of motion and neck supple.      Comments: Normal gait   Lymphadenopathy:      Cervical: No cervical adenopathy.   Skin:     General: Skin is warm and dry.   Neurological:      Mental Status: She is alert and oriented to person, place, and time.   Psychiatric:         Mood and Affect: Mood normal.         Behavior: Behavior normal.         Thought Content: Thought content normal.         Judgment: Judgment normal.                Assessment and Plan       Diagnoses and all orders for this visit:    1. Medicare annual wellness visit, subsequent (Primary)    2. Personal history of tobacco use, presenting hazards to health  -      CT Chest Low Dose Cancer Screening WO; Future    3. Memory change  -     ATN Profile  -     donepezil (Aricept) 5 MG tablet; Take 1 tablet by mouth Every Night.  Dispense: 90 tablet; Refill: 3    4. Elevated cholesterol  -     CBC & Differential  -     Comprehensive Metabolic Panel  -     Lipid Panel    5. Vitamin D deficiency  -     Vitamin D,25-Hydroxy    6. Post-menopausal  -     DEXA Bone Density Axial; Future    Other orders  -     Pneumococcal Conjugate Vaccine 21 (18+ yrs)    Medicare wellness completed.  Will order DEXA as well as CT lungs.  Smoking cessation recommended and she has been able to quit in past but then resumes smoking. Pneumop 21 given as well    Memory has been worse, will check ATN  panel and start low dose aricept.    Mood has been stressed but she declines medicine at this time    Will recheck lipids and Vit D and see if treatment needed

## 2025-07-18 NOTE — LETTER
[facility]  Vaccine Consent Form    Patient Name:  Dede Jerez  Patient :  1960     Vaccine(s) Ordered    Pneumococcal Conjugate Vaccine 21 (18+ yrs)        Screening Checklist  The following questions should be completed prior to vaccination. If you answer “yes” to any question, it does not necessarily mean you should not be vaccinated. It just means we may need to clarify or ask more questions. If a question is unclear, please ask your healthcare provider to explain it.    Yes No   Any fever or moderate to severe illness today (mild illness and/or antibiotic treatment are not contraindications)?     Do you have a history of a serious reaction to any previous vaccinations, such as anaphylaxis, encephalopathy within 7 days, Guillain-Hawarden syndrome within 6 weeks, seizure?     Have you received any live vaccine(s) (e.g MMR, CANDELARIA) or any other vaccines in the last month (to ensure duplicate doses aren't given)?     Do you have an anaphylactic allergy to latex (DTaP, DTaP-IPV, Hep A, Hep B, MenB, RV, Td, Tdap), baker’s yeast (Hep B, HPV), polysorbates (RSV, nirsevimab, PCV 20 and 21, Rotavirrus, Tdap, Shingrix), or gelatin (CANDELARIA, MMR)?     Do you have an anaphylactic allergy to neomycin (Rabies, CANDELARIA, MMR, IPV, Hep A), polymyxin B (IPV), or streptomycin (IPV)?      Any cancer, leukemia, AIDS, or other immune system disorder? (CANDELARIA, MMR, RV)     Do you have a parent, brother, or sister with an immune system problem (if immune competence of vaccine recipient clinically verified, can proceed)? (MMR, CANDELARIA)     Any recent steroid treatments for >2 weeks, chemotherapy, or radiation treatment? (CANDELARIA, MMR)     Have you received antibody-containing blood transfusions or IVIG in the past 11 months (recommended interval is dependent on product)? (MMR, CANDELARIA)     Have you taken antiviral drugs (acyclovir, famciclovir, valacyclovir for CANDELARIA) in the last 24 or 48 hours, respectively?      Are you pregnant or planning to  "become pregnant within 1 month? (CANDELARIA, MMR, HPV, IPV, MenB, Abrexvy; For Hep B- refer to Engerix-B; For RSV - Abrysvo is indicated for 32-36 weeks of pregnancy from September to January)     For infants, have you ever been told your child has had intussusception or a medical emergency involving obstruction of the intestine (Rotavirus)? If not for an infant, can skip this question.         *Ordering Physicians/APC should be consulted if \"yes\" is checked by the patient or guardian above.  I have received, read, and understand the Vaccine Information Statement (VIS) for each vaccine ordered.  I have considered my or my child's health status as well as the health status of my close contacts.  I have taken the opportunity to discuss my vaccine questions with my or my child's health care provider.   I have requested that the ordered vaccine(s) be given to me or my child.  I understand the benefits and risks of the vaccines.  I understand that I should remain in the clinic for 15 minutes after receiving the vaccine(s).  _________________________________________________________  Signature of Patient or Parent/Legal Guardian ____________________  Date     "

## 2025-07-22 LAB
25(OH)D3+25(OH)D2 SERPL-MCNC: 23.6 NG/ML (ref 30–100)
A -- BETA-AMYLOID 42/40 RATIO: 0.1
AL BETA40 PLAS-MCNC: 147.13 PG/ML
AL BETA42 PLAS-MCNC: 14.15 PG/ML
ALBUMIN SERPL-MCNC: 4.4 G/DL (ref 3.9–4.9)
ALP SERPL-CCNC: 81 IU/L (ref 44–121)
ALT SERPL-CCNC: 10 IU/L (ref 0–32)
AST SERPL-CCNC: 15 IU/L (ref 0–40)
ATN SUMMARY1: ABNORMAL
BASOPHILS # BLD AUTO: 0 X10E3/UL (ref 0–0.2)
BASOPHILS NFR BLD AUTO: 1 %
BILIRUB SERPL-MCNC: 0.4 MG/DL (ref 0–1.2)
BUN SERPL-MCNC: 12 MG/DL (ref 8–27)
BUN/CREAT SERPL: 16 (ref 12–28)
CALCIUM SERPL-MCNC: 9.5 MG/DL (ref 8.7–10.3)
CHLORIDE SERPL-SCNC: 103 MMOL/L (ref 96–106)
CHOLEST SERPL-MCNC: 212 MG/DL (ref 100–199)
CO2 SERPL-SCNC: 25 MMOL/L (ref 20–29)
CREAT SERPL-MCNC: 0.76 MG/DL (ref 0.57–1)
EGFRCR SERPLBLD CKD-EPI 2021: 87 ML/MIN/1.73
EOSINOPHIL # BLD AUTO: 0.1 X10E3/UL (ref 0–0.4)
EOSINOPHIL NFR BLD AUTO: 2 %
ERYTHROCYTE [DISTWIDTH] IN BLOOD BY AUTOMATED COUNT: 13 % (ref 11.7–15.4)
GLOBULIN SER CALC-MCNC: 2.4 G/DL (ref 1.5–4.5)
GLUCOSE SERPL-MCNC: 88 MG/DL (ref 70–99)
HCT VFR BLD AUTO: 46.5 % (ref 34–46.6)
HDLC SERPL-MCNC: 59 MG/DL
HGB BLD-MCNC: 14.7 G/DL (ref 11.1–15.9)
IMM GRANULOCYTES # BLD AUTO: 0 X10E3/UL (ref 0–0.1)
IMM GRANULOCYTES NFR BLD AUTO: 0 %
INFORMATION:: ABNORMAL
LDLC SERPL CALC-MCNC: 135 MG/DL (ref 0–99)
LYMPHOCYTES # BLD AUTO: 2 X10E3/UL (ref 0.7–3.1)
LYMPHOCYTES NFR BLD AUTO: 27 %
MCH RBC QN AUTO: 30.5 PG (ref 26.6–33)
MCHC RBC AUTO-ENTMCNC: 31.6 G/DL (ref 31.5–35.7)
MCV RBC AUTO: 97 FL (ref 79–97)
MONOCYTES # BLD AUTO: 0.5 X10E3/UL (ref 0.1–0.9)
MONOCYTES NFR BLD AUTO: 7 %
NEUTROPHILS # BLD AUTO: 4.7 X10E3/UL (ref 1.4–7)
NEUTROPHILS NFR BLD AUTO: 63 %
NFL CHAIN SERPL IA-MCNC: 2.52 PG/ML (ref 0–3.65)
P-TAU181 PLAS IA-MCNC: 1.31 PG/ML (ref 0–0.97)
PLATELET # BLD AUTO: 246 X10E3/UL (ref 150–450)
POTASSIUM SERPL-SCNC: 4.6 MMOL/L (ref 3.5–5.2)
PROT SERPL-MCNC: 6.8 G/DL (ref 6–8.5)
RBC # BLD AUTO: 4.82 X10E6/UL (ref 3.77–5.28)
SODIUM SERPL-SCNC: 141 MMOL/L (ref 134–144)
TRIGL SERPL-MCNC: 103 MG/DL (ref 0–149)
VLDLC SERPL CALC-MCNC: 18 MG/DL (ref 5–40)
WBC # BLD AUTO: 7.5 X10E3/UL (ref 3.4–10.8)

## 2025-07-23 ENCOUNTER — TELEPHONE (OUTPATIENT)
Dept: FAMILY MEDICINE CLINIC | Facility: CLINIC | Age: 65
End: 2025-07-23
Payer: MEDICARE

## 2025-07-23 NOTE — TELEPHONE ENCOUNTER
Caller: Dede Jerez    Relationship to patient: Self    Best call back number: [phone]     Patient is needing: A CALL BACK TO DISCUSS LAB RESULTS AND UPCOMING XRAY. PLEASE ADVISE.

## 2025-07-25 ENCOUNTER — TELEPHONE (OUTPATIENT)
Dept: FAMILY MEDICINE CLINIC | Facility: CLINIC | Age: 65
End: 2025-07-25
Payer: MEDICARE

## 2025-07-25 NOTE — TELEPHONE ENCOUNTER
Caller: Dede Jerez    Relationship: Self    Best call back number: [phone]     What is the best time to reach you: ANYTIME    Who are you requesting to speak with (clinical staff, provider,  specific staff member): DR ALVARADO    What was the call regarding: PATIENT HAS READ HER RESULTS AND STATED THAT THEY INDICATED THAT SHE HAS ALZHEIMER'S. SHE NEEDS PCP TO CALL HER TO TELL HER IF SHE IS READING IT RIGHT AND TO DISCUSS.    THIS IS HER SECOND REQUEST FOR A CALL.

## 2025-07-28 ENCOUNTER — TELEPHONE (OUTPATIENT)
Dept: FAMILY MEDICINE CLINIC | Facility: CLINIC | Age: 65
End: 2025-07-28
Payer: MEDICARE

## 2025-07-28 DIAGNOSIS — R41.3 MEMORY CHANGE: Primary | ICD-10-CM

## 2025-07-28 NOTE — TELEPHONE ENCOUNTER
Caller: Solitario Dede Zakiya    Relationship: Self    Best call back number: [phone]     What is the medical concern/diagnosis: MEMORY LOSS    What specialty or service is being requested: NEUROLOGY    What is the provider, practice or medical service name: [facility]    What is the office location: [place] [address]    What is the office phone number: [phone]    Any additional details: PATIENT WOULD LIKE A REFERRAL TO THIS NEURO FACILITY TO ADDRESS HER MEMORY LOSS. THE REFERRAL CAN BE FAXED [phone].

## 2025-08-20 ENCOUNTER — HOSPITAL ENCOUNTER (OUTPATIENT)
Dept: CT IMAGING | Facility: HOSPITAL | Age: 65
Discharge: HOME | End: 2025-08-20
Admitting: FAMILY MEDICINE
Payer: MEDICARE

## 2025-08-20 ENCOUNTER — HOSPITAL ENCOUNTER (OUTPATIENT)
Dept: BONE DENSITY | Facility: HOSPITAL | Age: 65
Discharge: HOME | End: 2025-08-20
Admitting: FAMILY MEDICINE
Payer: MEDICARE

## 2025-08-20 DIAGNOSIS — Z78.0 POST-MENOPAUSAL: ICD-10-CM

## 2025-08-20 DIAGNOSIS — Z87.891 PERSONAL HISTORY OF TOBACCO USE, PRESENTING HAZARDS TO HEALTH: ICD-10-CM

## 2025-08-20 PROCEDURE — 77080 DXA BONE DENSITY AXIAL: CPT

## 2025-08-20 PROCEDURE — 71271 CT THORAX LUNG CANCER SCR C-: CPT

## 2025-08-27 ENCOUNTER — TRANSCRIBE ORDERS (OUTPATIENT)
Dept: ADMINISTRATIVE | Facility: HOSPITAL | Age: 65
End: 2025-08-27
Payer: MEDICARE

## 2025-08-27 DIAGNOSIS — R29.818 TRANSIENT NEUROLOGIC DEFICIT: ICD-10-CM

## 2025-08-27 DIAGNOSIS — R51.9 HEADACHE AROUND THE EYES: ICD-10-CM

## 2025-08-27 DIAGNOSIS — R41.3 MEMORY CHANGE: Primary | ICD-10-CM

## (undated) DEVICE — SOL NACL 0.9PCT 1000ML

## (undated) DEVICE — DECANTER: Brand: UNBRANDED

## (undated) DEVICE — LIMB HOLDERS: Brand: DEROYAL

## (undated) DEVICE — Device: Brand: REFERENCE PATCH CARTO 3

## (undated) DEVICE — LEX ELECTRO PHYSIOLOGY: Brand: MEDLINE INDUSTRIES, INC.

## (undated) DEVICE — CATH QUAD CRD 6F5MM

## (undated) DEVICE — ST INF PRI SMRTSTE 20DRP 2VLV 24ML 117

## (undated) DEVICE — DRSNG SURESITE WNDW 2.38X2.75

## (undated) DEVICE — Device: Brand: WEBSTER

## (undated) DEVICE — SET PRIMARY GRVTY 10DP MALE LL 104IN

## (undated) DEVICE — INTRO SHEATH FASTCATH SRO .038IN 8.5F 63CM

## (undated) DEVICE — ADULT, W/LG. BACK PAD, RADIOTRANSPARENT ELEMENT AND LEAD WIRE: Brand: DEFIBRILLATION ELECTRODES

## (undated) DEVICE — ST EXT IV SMARTSITE 2VLV SP M LL 5ML IV1

## (undated) DEVICE — Device: Brand: EZ STEER NAV

## (undated) DEVICE — INTRO SHEATH ENGAGE W/50 GW .038 7F12

## (undated) DEVICE — Device: Brand: MEDEX

## (undated) DEVICE — DRSNG SURESITE123 4X4.8IN

## (undated) DEVICE — CANN NASL CO2 DIVIDED A/

## (undated) DEVICE — DECANT BG O JET